# Patient Record
Sex: FEMALE | Race: BLACK OR AFRICAN AMERICAN | NOT HISPANIC OR LATINO | Employment: FULL TIME | URBAN - METROPOLITAN AREA
[De-identification: names, ages, dates, MRNs, and addresses within clinical notes are randomized per-mention and may not be internally consistent; named-entity substitution may affect disease eponyms.]

---

## 2017-02-08 RX ORDER — METHOCARBAMOL 750 MG/1
750 TABLET, FILM COATED ORAL 3 TIMES DAILY
Qty: 60 TABLET | Refills: 0 | Status: SHIPPED | OUTPATIENT
Start: 2017-02-08 | End: 2018-08-30

## 2017-02-24 ENCOUNTER — OFFICE VISIT (OUTPATIENT)
Dept: ORTHOPEDICS | Facility: CLINIC | Age: 47
End: 2017-02-24
Payer: COMMERCIAL

## 2017-02-24 ENCOUNTER — HOSPITAL ENCOUNTER (OUTPATIENT)
Dept: RADIOLOGY | Facility: HOSPITAL | Age: 47
Discharge: HOME | End: 2017-02-24
Attending: ORTHOPAEDIC SURGERY
Payer: COMMERCIAL

## 2017-02-24 VITALS
SYSTOLIC BLOOD PRESSURE: 154 MMHG | WEIGHT: 291.44 LBS | BODY MASS INDEX: 46.84 KG/M2 | DIASTOLIC BLOOD PRESSURE: 92 MMHG | HEART RATE: 99 BPM | HEIGHT: 66 IN

## 2017-02-24 DIAGNOSIS — M92.62 HAGLUND'S DEFORMITY, LEFT: ICD-10-CM

## 2017-02-24 DIAGNOSIS — R52 PAIN: ICD-10-CM

## 2017-02-24 DIAGNOSIS — R52 PAIN: Primary | ICD-10-CM

## 2017-02-24 DIAGNOSIS — M76.62 ACHILLES TENDINITIS, LEFT LEG: ICD-10-CM

## 2017-02-24 PROCEDURE — 73630 X-RAY EXAM OF FOOT: CPT | Mod: TC,LT

## 2017-02-24 PROCEDURE — 3077F SYST BP >= 140 MM HG: CPT | Mod: S$GLB,,, | Performed by: ORTHOPAEDIC SURGERY

## 2017-02-24 PROCEDURE — 99213 OFFICE O/P EST LOW 20 MIN: CPT | Mod: S$GLB,,, | Performed by: ORTHOPAEDIC SURGERY

## 2017-02-24 PROCEDURE — 73630 X-RAY EXAM OF FOOT: CPT | Mod: 26,LT,, | Performed by: RADIOLOGY

## 2017-02-24 PROCEDURE — 1160F RVW MEDS BY RX/DR IN RCRD: CPT | Mod: S$GLB,,, | Performed by: ORTHOPAEDIC SURGERY

## 2017-02-24 PROCEDURE — 3080F DIAST BP >= 90 MM HG: CPT | Mod: S$GLB,,, | Performed by: ORTHOPAEDIC SURGERY

## 2017-02-24 PROCEDURE — 99999 PR PBB SHADOW E&M-EST. PATIENT-LVL III: CPT | Mod: PBBFAC,,, | Performed by: ORTHOPAEDIC SURGERY

## 2017-02-24 RX ORDER — METHYLPREDNISOLONE 4 MG/1
TABLET ORAL
Qty: 1 PACKAGE | Refills: 0 | Status: SHIPPED | OUTPATIENT
Start: 2017-02-24 | End: 2017-03-24

## 2017-02-24 RX ORDER — MELOXICAM 15 MG/1
15 TABLET ORAL DAILY PRN
Qty: 30 TABLET | Refills: 3 | Status: ON HOLD | OUTPATIENT
Start: 2017-02-24 | End: 2017-11-15 | Stop reason: HOSPADM

## 2017-02-24 NOTE — MR AVS SNAPSHOT
[facility]  [address]  [facility] [address]  Phone: [phone]                  Steph Sterling   2017 2:45 PM   Office Visit    Description:  Female : 1970   Provider:  Bunny Saravia MD   Department:  [facility]           Reason for Visit     Follow-up           Diagnoses this Visit        Comments    Pain    -  Primary     Achilles tendinitis, left leg         Mohsen's deformity, left         Follow-up examination after orthopedic surgery         Achilles tendinitis of right lower extremity                To Do List           Goals (5 Years of Data)     None       These Medications        Disp Refills Start End    meloxicam (MOBIC) 15 MG tablet 30 tablet 3 2017     Take 1 tablet (15 mg total) by mouth daily as needed for Pain. - Oral    Pharmacy: [facility]  [facility] AT [facility] Ph #: [phone]       methylPREDNISolone (MEDROL, YAMILET,) 4 mg tablet 1 Package 0 2017     Take as instructed on package    Pharmacy: [facility]  [place] AT [facility] Ph #: [phone]         [facility]     [facility] -  Assistance  Registered nurses in the [facility] provide clinical advisement, health education, appointment booking, and other advisory services.  Call for this free service at [phone].             Medications           Message regarding Medications     Verify the changes and/or additions to your medication regime listed below are the same as discussed with your clinician today.  If any of these changes or additions are incorrect, please notify your healthcare provider.        START taking these NEW medications        Refills    methylPREDNISolone (MEDROL, YAMLIET,) 4 mg tablet 0    Sig: Take as instructed on package    Class: Normal           Verify that the below list of  medications is an accurate representation of the medications you are currently taking.  If none reported, the list may be blank. If incorrect, please contact your healthcare provider. Carry this list with you in case of emergency.           Current Medications     meloxicam (MOBIC) 15 MG tablet Take 1 tablet (15 mg total) by mouth daily as needed for Pain.    verapamil (VERELAN) 240 MG C24P Take 1 capsule (240 mg total) by mouth once daily.    diclofenac sodium (VOLTAREN) 1 % Gel Apply 2 g topically 4 (four) times daily.    methocarbamol (ROBAXIN) 750 MG Tab Take 1 tablet (750 mg total) by mouth 3 (three) times daily.    methylPREDNISolone (MEDROL, YAMILET,) 4 mg tablet Take as instructed on package           Clinical Reference Information           Your Vitals Were     BP                   154/92 (BP Location: Right arm, Patient Position: Sitting, BP Method: Automatic)           Blood Pressure          Most Recent Value    BP  (!)  154/92      Allergies as of 2/24/2017     No Known Allergies      Immunizations Administered on Date of Encounter - 2/24/2017     None      Orders Placed During Today's Visit     Future Labs/Procedures Expected by Expires    X-Ray Foot Complete Left  2/24/2017 2/24/2018      Language Assistance Services     ATTENTION: Language assistance services are available, free of charge. Please call [phone].      ATENCIÓN: Si jasonla joshua, tiene a roblero disposición servicios gratuitos de asistencia lingüística. Llame al [phone].     LENORE Ý: N?u b?n nói Ti?ng Vi?t, có các d?ch v? h? tr? ngôn ng? mi?n phí dành cho b?n. G?i s? [phone].         [facility] complies with applicable Federal civil rights laws and does not discriminate on the basis of race, color, national origin, age, disability, or sex.

## 2017-02-27 NOTE — PROGRESS NOTES
Ms. Sterling returns today.  This is a 46-year-old female who had previous surgery   on her right Achilles tendon insertion almost over a year ago now.  She reports   she is doing well on the right side, but over the last three months, she has   developed some similar symptoms at the insertion of her left Achilles tendon.    She has not had any treatment to this point.  She reports not improving, so she   came in for evaluation.    PHYSICAL EXAMINATION:  Reveals an enlargement and tenderness at the insertion of   the left Achilles tendon.  I ordered an x-ray of her left foot and she has a   Mohsen deformity of the posterior superior aspect of the calcaneus as well as   some slight insertional osteophyte formation and thickened Achilles tendon   shadow.    IMPRESSION:  Left insertional Achilles tendinitis with a Mohsen deformity.    RECOMMENDATIONS:  Treatment options were discussed with Ms. Sterling.  She would   like to try conservative measures before entertaining any surgical intervention.    She is going to go into her fracture boot for the next four weeks to see if   her symptoms will cool down.  I am going to put her on a Medrol Greg.  I am going   to have her return to see me in 4-6 weeks.  If her symptoms are improved, we   will proceed with physical therapy at that time.      EFRAÍN/IN  dd: 02/27/2017 07:12:56 (CST)  td: 02/28/2017 00:21:56 (CST)  Doc ID   #[id number]  Job ID #[id number]    CC:

## 2017-03-01 ENCOUNTER — PATIENT MESSAGE (OUTPATIENT)
Dept: ORTHOPEDICS | Facility: CLINIC | Age: 47
End: 2017-03-01

## 2017-03-24 ENCOUNTER — OFFICE VISIT (OUTPATIENT)
Dept: ORTHOPEDICS | Facility: CLINIC | Age: 47
End: 2017-03-24
Payer: COMMERCIAL

## 2017-03-24 VITALS — WEIGHT: 291.44 LBS | HEIGHT: 66 IN | BODY MASS INDEX: 46.84 KG/M2

## 2017-03-24 DIAGNOSIS — M76.62 ACHILLES TENDINITIS, LEFT LEG: Primary | ICD-10-CM

## 2017-03-24 PROCEDURE — 99213 OFFICE O/P EST LOW 20 MIN: CPT | Mod: S$GLB,,, | Performed by: ORTHOPAEDIC SURGERY

## 2017-03-24 PROCEDURE — 1160F RVW MEDS BY RX/DR IN RCRD: CPT | Mod: S$GLB,,, | Performed by: ORTHOPAEDIC SURGERY

## 2017-03-24 PROCEDURE — 99999 PR PBB SHADOW E&M-EST. PATIENT-LVL III: CPT | Mod: PBBFAC,,, | Performed by: ORTHOPAEDIC SURGERY

## 2017-03-25 NOTE — PROGRESS NOTES
Ms. Sterling returns today.  She had developed some left insertional Achilles   tendinitis over the last few months.  I saw her four weeks ago and recommended a   period of boot immobilization.  She returns today for followup.  She reports   her symptoms are improved.  On exam, there is minimal tenderness and swelling.    At this point, I am going to send her to Physical Therapy and have her return to   see me in six weeks.  She can come out of the boot as well.      EFRAÍN/ASHLEY  dd: 03/24/2017 15:14:37 (CDT)  td: 03/25/2017 05:14:58 (CDT)  Doc ID   #[id number]  Job ID #[id number]    CC:

## 2017-04-11 ENCOUNTER — CLINICAL SUPPORT (OUTPATIENT)
Dept: REHABILITATION | Facility: HOSPITAL | Age: 47
End: 2017-04-11
Attending: ORTHOPAEDIC SURGERY
Payer: COMMERCIAL

## 2017-04-11 DIAGNOSIS — R26.2 DIFFICULTY WALKING: Primary | ICD-10-CM

## 2017-04-11 DIAGNOSIS — M25.60 JOINT STIFFNESS: ICD-10-CM

## 2017-04-11 DIAGNOSIS — M76.62 ACHILLES TENDINITIS OF LEFT LOWER EXTREMITY: ICD-10-CM

## 2017-04-11 DIAGNOSIS — M62.81 MUSCLE WEAKNESS: ICD-10-CM

## 2017-04-11 PROCEDURE — 97161 PT EVAL LOW COMPLEX 20 MIN: CPT | Mod: PN

## 2017-04-11 PROCEDURE — 97140 MANUAL THERAPY 1/> REGIONS: CPT | Mod: PN

## 2017-04-11 PROCEDURE — 97110 THERAPEUTIC EXERCISES: CPT | Mod: PN

## 2017-04-11 NOTE — PROGRESS NOTES
k  TIME RECORD    Date: 04/11/2017    Start Time:  8:00  Stop Time:  9:00      Total Timed Minutes:  60 minutes      OUTPATIENT PHYSICAL THERAPY   PATIENT EVALUATION  Onset Date: October 2016  Primary Diagnosis:   1. Difficulty walking     2. Achilles tendinitis of left lower extremity     3. Joint stiffness     4. Muscle weakness       Treatment Diagnosis: see above  Past Medical History:   Diagnosis Date    HTN (hypertension) 12/26/2012    Migraine with acute onset aura 12/26/2012     Precautions: none  Prior Therapy:    Medications: Steph Sterling has a current medication list which includes the following prescription(s): diclofenac sodium, meloxicam, methocarbamol, and verapamil.  Nutrition:  Obese  Prior Level of Function: Independent  Social History:   Place of Residence (Steps/Adaptations): [facility]     Steph Sterling is a 46 year old female presenting with c/o left ankle pain and a diagnosis of achilles tendinitis.   She reports an insidious onset last October that is worsening.  Recent x-rays are negative. She currently works as a teacher and encounters multiple stairs daily. Her goal is to avoid surgery and to stand and walk as needed without pain.     Pain:  Location: left achilles tendon    Activities Which Increase Pain: A.M,  Initial walking, standing (15 min), stairs  Activities Which Decrease Pain: rest, medication  Pain Scale: 2/10 at best 6/10 now  8/10 at worst    Objective     Observation:  Pt presents ambulating with an antalgic gait. Slight decrease in stance on the involved left LE.  Stands with mild forefoot pronation.   Palpation: moderate tenderness on palpation medial distal achilles. Slight tess-calcaneal swelling globally.     Range of Motion/Strength:      Ankle  Right  LEFT     AROM MMT AROM MMT   Dorsiflexion 2 4 -2 4-   Plantar Flexion 42 4 38 4-   Inversion 33 4 28 4-   Eversion 12 4 8 4-   Soleus 8 4 4 4-       AROM: Bilateral LE: Grossly WFL  MMT:   Right LE: 4/5   Left LE: 4/5      Bed Mobility:Independent  Transfers: Independent      Treatment:   Pt received therapeutic exercises to develop strength, endurance, ROM, flexibility, posture and core stabilization for 15 minutes including:    -towel stretch 20 sec x 4  -gastroc stretch 30 sec x 4  -soleus stretch 30 sec x 4      Pt received manual therapy to improve mobility for 10 minutes:  -tool assisted STM to distal achilles, gastroc/soleus complex    Ice massage distal achilles post treatment x 5 min      Pt was instructed in and given a home exercise program consisting of the above activities.       Assessment     Pt presents with signs and symptoms consistent with referring diagnosis. Evaluation has determined a decrease in functional status and subjective and objective deficits that can be addressed by physical therapy intervention. Pt demonstrates pain limiting functional activities. Decreased flexibility and strength limiting normal movement patterns. Decreased segmental motion. Decreased postural strength and awareness. Positive special testing. Decreased participation in functional and recreational activities. Subjective and objective measures are addressed by goals in the plan of care.  Patient/family are involved in the development of these goals. Patient/family are educated about current injury and treatment. Pt demonstrates no additional cultural, spiritual or educational need and currently has no barriers to learning.      Pt responded well to treatment today. Pt is a good candidate for skilled physical therapy intervention and has a good prognosis and is motivated to return to functional and  recreational activities.    Rehab Potential: good     History  Co-morbidities and personal factors that may impact the plan of care Examination  Body Structures and Functions, activity limitations and participation restrictions that may impact the plan of care Clinical Presentation   Decision Making/  Complexity Score   Co-morbidities:   obesity             Personal Factors:   School schedule Body Regions:  ankle        Body Systems: musculoskeletal          Activity limitations:  Prolonged standing, walking, stairs      Participation Restrictions:  School teaching, household chores         Stable  Low         Short Term Goals (4 Weeks):     1.Pt to increase strength by a 1/2 grade of muscles test to allow for improvement in functional activities such as performing chores.  2.Pt to improve range of motion by 25% to allow for improved functional mobility to allow for improvement in IADLs.   3.Pt to report compliance with HEP and demonstrate proper exercise technique to PT to show competence with self management of condition.  4.Decrease pain by 25% during functional activities.    Long Term Goals (12 Weeks):     1. Increase ROM to allow improved joint biomechanics during functional activities.   2.Increase trunk and lower extremity strength to within normal limits during functional activities.   3. Independent with home exercise program.   4. Full return to functional activities with manageable complaints.  5. Patient to demonstrate improved posture and body mechanics.  6. Decrease pain by 75% during functional activities.    CMS Impairment/Limitation/Restriction for FOTO Ankle Survey  Status Limitation G-Code CMS Severity Modifier  Intake 30% 70% Current Status CL - At least 60 percent but less than 80 percent  Predicted 56% 44% Goal Status+ CK - At least 40 percent but less than 60 percent  PT goal: 20-40% impaired    Plan     Certification Period: 4/11/17 to 7/11/17    Recommended Treatment Plan: 2-3 times per week for 12 weeks with treatments to consist of:  Neuromuscular and postural re-education,  training, therapeutic exercise, therapeutic activities,balance training, gait training, manual therapy, soft tissue mobilization, ROM exercises, Cardiovascular, Postural stabilization, manual  traction, spinal mobilization, moist heat, cryotherapy, electrical stimulation, ultrasound, home exercise education and planning.      Therapist: Yan Nelson, PT

## 2017-04-11 NOTE — PLAN OF CARE
OUTPATIENT PHYSICAL THERAPY   PATIENT EVALUATION  Onset Date: October 2016  Primary Diagnosis:   1. Difficulty walking     2. Achilles tendinitis of left lower extremity     3. Joint stiffness     4. Muscle weakness       Treatment Diagnosis: see above  Past Medical History:   Diagnosis Date    HTN (hypertension) 12/26/2012    Migraine with acute onset aura 12/26/2012     Precautions: none  Prior Therapy:    Medications: Steph Sterling has a current medication list which includes the following prescription(s): diclofenac sodium, meloxicam, methocarbamol, and verapamil.  Nutrition:  Obese  Prior Level of Function: Independent  Social History:   Place of Residence (Steps/Adaptations): [facility]    Subjective     Steph Sterling is a 46 year old female presenting with c/o left ankle pain and a diagnosis of achilles tendinitis.   She reports an insidious onset last October that is worsening.  Recent x-rays are negative. She currently works as a teacher and encounters multiple stairs daily. Her goal is to avoid surgery and to stand and walk as needed without pain.     Pain:  Location: left achilles tendon    Activities Which Increase Pain: A.M,  Initial walking, standing (15 min), stairs  Activities Which Decrease Pain: rest, medication  Pain Scale: 2/10 at best 6/10 now  8/10 at worst    Objective     Observation:  Pt presents ambulating with an antalgic gait. Slight decrease in stance on the involved left LE.  Stands with mild forefoot pronation.   Palpation: moderate tenderness on palpation medial distal achilles. Slight tess-calcaneal swelling globally.     Range of Motion/Strength:      Ankle  Right  LEFT     AROM MMT AROM MMT   Dorsiflexion 2 4 -2 4-   Plantar Flexion 42 4 38 4-   Inversion 33 4 28 4-   Eversion 12 4 8 4-   Soleus 8 4 4 4-       AROM: Bilateral LE: Grossly WFL  MMT:  Right LE: 4/5   Left LE: 4/5      Bed Mobility:Independent  Transfers: Independent      Treatment:   Pt received  therapeutic exercises to develop strength, endurance, ROM, flexibility, posture and core stabilization for 15 minutes including:    -towel stretch 20 sec x 4  -gastroc stretch 30 sec x 4  -soleus stretch 30 sec x 4      Pt received manual therapy to improve mobility for 10 minutes:  -tool assisted STM to distal achilles, gastroc/soleus complex    Ice massage distal achilles post treatment x 5 min      Pt was instructed in and given a home exercise program consisting of the above activities.       Assessment     Pt presents with signs and symptoms consistent with referring diagnosis. Evaluation has determined a decrease in functional status and subjective and objective deficits that can be addressed by physical therapy intervention. Pt demonstrates pain limiting functional activities. Decreased flexibility and strength limiting normal movement patterns. Decreased segmental motion. Decreased postural strength and awareness. Positive special testing. Decreased participation in functional and recreational activities. Subjective and objective measures are addressed by goals in the plan of care.  Patient/family are involved in the development of these goals. Patient/family are educated about current injury and treatment. Pt demonstrates no additional cultural, spiritual or educational need and currently has no barriers to learning.      Pt responded well to treatment today. Pt is a good candidate for skilled physical therapy intervention and has a good prognosis and is motivated to return to functional and  recreational activities.    Rehab Potential: good     History  Co-morbidities and personal factors that may impact the plan of care Examination  Body Structures and Functions, activity limitations and participation restrictions that may impact the plan of care Clinical Presentation   Decision Making/ Complexity Score   Co-morbidities:   obesity             Personal Factors:   School schedule Body Regions:   ankle        Body Systems: musculoskeletal          Activity limitations:  Prolonged standing, walking, stairs      Participation Restrictions:  School teaching, household chores         Stable  Low         Short Term Goals (4 Weeks):     1.Pt to increase strength by a 1/2 grade of muscles test to allow for improvement in functional activities such as performing chores.  2.Pt to improve range of motion by 25% to allow for improved functional mobility to allow for improvement in IADLs.   3.Pt to report compliance with HEP and demonstrate proper exercise technique to PT to show competence with self management of condition.  4.Decrease pain by 25% during functional activities.    Long Term Goals (12 Weeks):     1. Increase ROM to allow improved joint biomechanics during functional activities.   2.Increase trunk and lower extremity strength to within normal limits during functional activities.   3. Independent with home exercise program.   4. Full return to functional activities with manageable complaints.  5. Patient to demonstrate improved posture and body mechanics.  6. Decrease pain by 75% during functional activities.    CMS Impairment/Limitation/Restriction for FOTO Ankle Survey  Status Limitation G-Code CMS Severity Modifier  Intake 30% 70% Current Status CL - At least 60 percent but less than 80 percent  Predicted 56% 44% Goal Status+ CK - At least 40 percent but less than 60 percent  PT goal: 20-40% impaired    Plan     Certification Period: 4/11/17 to 7/11/17    Recommended Treatment Plan: 2-3 times per week for 12 weeks with treatments to consist of:  Neuromuscular and postural re-education,  training, therapeutic exercise, therapeutic activities,balance training, gait training, manual therapy, soft tissue mobilization, ROM exercises, Cardiovascular, Postural stabilization, manual traction, spinal mobilization, moist heat, cryotherapy, electrical stimulation, ultrasound, home exercise education  and planning.      Therapist: Yan Nelson, PT

## 2017-04-17 ENCOUNTER — CLINICAL SUPPORT (OUTPATIENT)
Dept: REHABILITATION | Facility: HOSPITAL | Age: 47
End: 2017-04-17
Attending: ORTHOPAEDIC SURGERY
Payer: COMMERCIAL

## 2017-04-17 DIAGNOSIS — R26.2 DIFFICULTY WALKING: ICD-10-CM

## 2017-04-17 DIAGNOSIS — M76.62 ACHILLES TENDINITIS OF LEFT LOWER EXTREMITY: Primary | ICD-10-CM

## 2017-04-17 DIAGNOSIS — M25.60 JOINT STIFFNESS: ICD-10-CM

## 2017-04-17 DIAGNOSIS — M62.81 MUSCLE WEAKNESS: ICD-10-CM

## 2017-04-17 PROCEDURE — 97110 THERAPEUTIC EXERCISES: CPT | Mod: PN

## 2017-04-17 PROCEDURE — 97140 MANUAL THERAPY 1/> REGIONS: CPT | Mod: 59,PN

## 2017-04-17 NOTE — PROGRESS NOTES
Physical Therapy Daily Note     Name: Steph Sterling  Clinic Number: [id number]  Diagnosis:   Encounter Diagnoses   Name Primary?    Achilles tendinitis of left lower extremity Yes    Difficulty walking     Joint stiffness     Muscle weakness      Physician: Bunny Saravia MD  Visit #: 2 of 25   PTA Visit #: 1     Time In: 0932  Time Out: 1030  Total Treatment Time: 58 minutes (1:1 with PTA 30 minutes of treatment session)     Subjective     Pt reports: Steph states her ankle is feeling okay this morning. Patient reports compliance with HEP the last week.   Pain Scale: Steph rates pain on a scale of 0-10 to be 6 currently.    Objective     Steph received individual therapeutic exercises to develop strength, endurance, ROM, flexibility, posture and core stabilization for 40 minutes including:  -towel stretch 20 sec x 4  -CW/CCW circles 2x10 ea direction  -ABC's x 1 trial  -4 way ankle strengthening 2x10 (YTB)  -Towel crunches 2x10, 5 s ec hold   -Seated heel raises on wedge 2x10  -Standing gastroc stretch 30 sec x 4  -Standing soleus stretch 30 sec x 4    Steph received the following manual therapy techniques: Soft tissue Mobilization and Friction Massage were applied to the: left achilles/gastroc complex for 15 minutes including:    Steph received ice massage to left distal achilles x 5 minutes.    Written Home Exercises Provided: Reviewed HEP from IE.  Pt demo good understanding of the education provided. Steph demonstrated good return demonstration of activities.     Education provided re:Steph verbalized good understanding of education provided.   No spiritual or educational barriers to learning provided    Assessment     Steph tolerated treatment well today. Patient with fair tolerance to manual therapy techniques with increased hypersensitivity noted at distal achilles. Patient with moderate restriction throughout gastroc/solues complex  contributing to decreased dorsiflexion ROM. Patient ambulates with antalgic gait pattern with decreased weightbearing through LLE observed.   This is a 46 y.o. female referred to outpatient physical therapy and presents with a medical diagnosis of left achilles tendonitis and demonstrates limitations as described in the problem list. Pt prognosis is Good. Pt will continue to benefit from skilled outpatient physical therapy to address the deficits listed in the problem list, provide pt/family education and to maximize pt's level of independence in the home and community environment.     Goals as follows:    Short Term Goals (4 Weeks):     1.Pt to increase strength by a 1/2 grade of muscles test to allow for improvement in functional activities such as performing chores.  2.Pt to improve range of motion by 25% to allow for improved functional mobility to allow for improvement in IADLs.   3.Pt to report compliance with HEP and demonstrate proper exercise technique to PT to show competence with self management of condition.  4.Decrease pain by 25% during functional activities.    Long Term Goals (12 Weeks):     1. Increase ROM to allow improved joint biomechanics during functional activities.   2.Increase trunk and lower extremity strength to within normal limits during functional activities.   3. Independent with home exercise program.   4. Full return to functional activities with manageable complaints.  5. Patient to demonstrate improved posture and body mechanics.  6. Decrease pain by 75% during functional activities     Plan     Continue with established Plan of Care towards PT goals.    Therapist: Amina Epperson, PTA  4/17/2017

## 2017-04-25 ENCOUNTER — CLINICAL SUPPORT (OUTPATIENT)
Dept: REHABILITATION | Facility: HOSPITAL | Age: 47
End: 2017-04-25
Attending: ORTHOPAEDIC SURGERY
Payer: COMMERCIAL

## 2017-04-25 DIAGNOSIS — R26.2 DIFFICULTY WALKING: ICD-10-CM

## 2017-04-25 DIAGNOSIS — M76.62 ACHILLES TENDINITIS OF LEFT LOWER EXTREMITY: Primary | ICD-10-CM

## 2017-04-25 DIAGNOSIS — M25.60 JOINT STIFFNESS: ICD-10-CM

## 2017-04-25 DIAGNOSIS — M62.81 MUSCLE WEAKNESS: ICD-10-CM

## 2017-04-25 PROCEDURE — 97110 THERAPEUTIC EXERCISES: CPT | Mod: PN

## 2017-04-25 PROCEDURE — 97140 MANUAL THERAPY 1/> REGIONS: CPT | Mod: PN

## 2017-04-25 NOTE — PROGRESS NOTES
Physical Therapy Daily Note     Name: Steph Sterling  Clinic Number: [id number]  Diagnosis:   Encounter Diagnoses   Name Primary?    Achilles tendinitis of left lower extremity Yes    Difficulty walking     Joint stiffness     Muscle weakness      Physician: Bunny Saravia MD  Visit #: 3 of 25     Time In: 4:20  Time Out: 5:15  Total Treatment Time: 55 minutes (1:1 with PT for 45 minutes of treatment session)     Subjective     Pt reports: Pt states her heel continues to be moderately sore.   Pain Scale: Steph rates pain on a scale of 0-10 to be 6 currently.    Objective     Steph received individual therapeutic exercises to develop strength, endurance, ROM, flexibility, posture and core stabilization for 40 minutes including:  -towel stretch 20 sec x 4  -CW/CCW circles 2x10 ea direction  -ABC's x 1 trial  -4 way ankle strengthening 2x10 (YTB)  -Towel crunches 2x10, 5 s ec hold   -Seated heel raises on wedge 2x10  -Standing gastroc stretch 30 sec x 4  -Standing soleus stretch 30 sec x 4  -standing eccentric heel raises 2 up 1 down 3 x 10    Steph received the following manual therapy techniques: Soft tissue Mobilization and Friction Massage were applied to the: left achilles/gastroc complex for 15 minutes including:    Steph received ice massage to left distal achilles x 5 minutes.    Written Home Exercises Provided: Reviewed HEP from IE.  Pt demo good understanding of the education provided. Steph demonstrated good return demonstration of activities.     Education provided re:Steph verbalized good understanding of education provided.   No spiritual or educational barriers to learning provided    Assessment     Continued moderate tenderness on palpation of distal achilles globally with STM. Pt demonstrates a good understanding of the education provided and a good return demonstration of activities. Pt  Requires skilled supervision to complete and  progress home program.    Medical necessity is demonstrated by the following IMPAIRMENTS:  -pain   -decreased range of motion/flexibility   -decreased muscle strength   -impaired function -    -decreased ADL ability  -decreased recreational ability     Patient is making good progress towards established goals.      Short Term Goals (4 Weeks):     1.Pt to increase strength by a 1/2 grade of muscles test to allow for improvement in functional activities such as performing chores.  2.Pt to improve range of motion by 25% to allow for improved functional mobility to allow for improvement in IADLs.   3.Pt to report compliance with HEP and demonstrate proper exercise technique to PT to show competence with self management of condition.  4.Decrease pain by 25% during functional activities.    Long Term Goals (12 Weeks):     1. Increase ROM to allow improved joint biomechanics during functional activities.   2.Increase trunk and lower extremity strength to within normal limits during functional activities.   3. Independent with home exercise program.   4. Full return to functional activities with manageable complaints.  5. Patient to demonstrate improved posture and body mechanics.  6. Decrease pain by 75% during functional activities     Plan     Continue with established Plan of Care towards PT goals.    Therapist: Yan Nelson, PT  4/25/2017

## 2017-05-02 ENCOUNTER — CLINICAL SUPPORT (OUTPATIENT)
Dept: REHABILITATION | Facility: HOSPITAL | Age: 47
End: 2017-05-02
Attending: ORTHOPAEDIC SURGERY
Payer: COMMERCIAL

## 2017-05-02 DIAGNOSIS — M62.81 MUSCLE WEAKNESS: ICD-10-CM

## 2017-05-02 DIAGNOSIS — R26.2 DIFFICULTY WALKING: ICD-10-CM

## 2017-05-02 DIAGNOSIS — M76.62 ACHILLES TENDINITIS OF LEFT LOWER EXTREMITY: Primary | ICD-10-CM

## 2017-05-02 DIAGNOSIS — M25.60 JOINT STIFFNESS: ICD-10-CM

## 2017-05-02 PROCEDURE — 97110 THERAPEUTIC EXERCISES: CPT | Mod: PN

## 2017-05-02 PROCEDURE — 97140 MANUAL THERAPY 1/> REGIONS: CPT | Mod: PN

## 2017-05-02 NOTE — PROGRESS NOTES
Physical Therapy Daily Note     Name: Steph Sterling  Clinic Number: [id number]  Diagnosis:   Encounter Diagnoses   Name Primary?    Achilles tendinitis of left lower extremity Yes    Difficulty walking     Joint stiffness     Muscle weakness      Physician: Bunny Saravia MD  Visit #: 4 of 25     Time In: 1632  Time Out: 3505  Total Treatment Time: 53 minutes (1:1 with PTA for 38 minutes of treatment session)     Subjective     Pt reports: Patient she is really starting to feel a difference in her foot. 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   Pain Scale: Steph rates pain on a scale of 0-10 to be 6 currently.    Objective     Steph received individual therapeutic exercises to develop strength, endurance, ROM, flexibility, posture and core stabilization for 40 minutes including:  Nu step x 6 minutes   -towel stretch 20 sec x 4  -CW/CCW circles 2x10 ea direction  -ABC's x 1 trial  -4 way ankle strengthening 2x10 (RTB)  -Towel crunches 2x10, 5 s ec hold   -Seated heel raises on wedge 2x10  -Standing gastroc stretch 30 sec x 4  -Standing soleus stretch 30 sec x 4  -standing eccentric heel raises 2 up 1 down 3 x 10    Steph received the following manual therapy techniques: Soft tissue Mobilization and Friction Massage were applied to the: left achilles/gastroc complex for 15 minutes including:    Steph received ice massage to left distal achilles x 5 minutes.    Written Home Exercises Provided: Reviewed HEP from IE.  Pt demo good understanding of the education provided. Steph demonstrated good return demonstration of activities.     Education provided re:Steph verbalized good understanding of education provided.   No spiritual or educational barriers to learning provided    Assessment     Steph tolerated treatment well today. Patient demonstrates improving tolerance to manual therapy  techniques but mod/max tissue restrictions continue to be palpated through entire gastroc/soleous complex. Patient able to progress resistance of ankle strengthening today with good ability to isolate ankle musculature noted. Pt demonstrates a good understanding of the education provided and a good return demonstration of activities. Pt  Requires skilled supervision to complete and progress home program.    Medical necessity is demonstrated by the following IMPAIRMENTS:  -pain   -decreased range of motion/flexibility   -decreased muscle strength   -impaired function -    -decreased ADL ability  -decreased recreational ability     Patient is making good progress towards established goals.    Short Term Goals (4 Weeks):     1.Pt to increase strength by a 1/2 grade of muscles test to allow for improvement in functional activities such as performing chores.  2.Pt to improve range of motion by 25% to allow for improved functional mobility to allow for improvement in IADLs.   3.Pt to report compliance with HEP and demonstrate proper exercise technique to PT to show competence with self management of condition.  4.Decrease pain by 25% during functional activities.    Long Term Goals (12 Weeks):     1. Increase ROM to allow improved joint biomechanics during functional activities.   2.Increase trunk and lower extremity strength to within normal limits during functional activities.   3. Independent with home exercise program.   4. Full return to functional activities with manageable complaints.  5. Patient to demonstrate improved posture and body mechanics.  6. Decrease pain by 75% during functional activities     Plan     Continue with established Plan of Care towards PT goals.    Therapist: Amina Epperson, PTA  5/2/2017

## 2017-05-04 ENCOUNTER — CLINICAL SUPPORT (OUTPATIENT)
Dept: REHABILITATION | Facility: HOSPITAL | Age: 47
End: 2017-05-04
Attending: ORTHOPAEDIC SURGERY
Payer: COMMERCIAL

## 2017-05-04 DIAGNOSIS — M25.571 CHRONIC PAIN OF RIGHT ANKLE: ICD-10-CM

## 2017-05-04 DIAGNOSIS — M25.60 JOINT STIFFNESS: ICD-10-CM

## 2017-05-04 DIAGNOSIS — M76.62 ACHILLES TENDINITIS OF LEFT LOWER EXTREMITY: Primary | ICD-10-CM

## 2017-05-04 DIAGNOSIS — G89.29 CHRONIC PAIN OF RIGHT ANKLE: ICD-10-CM

## 2017-05-04 DIAGNOSIS — M62.81 MUSCLE WEAKNESS: ICD-10-CM

## 2017-05-04 DIAGNOSIS — R26.2 DIFFICULTY WALKING: ICD-10-CM

## 2017-05-04 PROCEDURE — 97110 THERAPEUTIC EXERCISES: CPT | Mod: PN

## 2017-05-04 PROCEDURE — 97140 MANUAL THERAPY 1/> REGIONS: CPT | Mod: PN

## 2017-05-04 NOTE — PROGRESS NOTES
Physical Therapy Daily Note     Name: Steph Sterling  Clinic Number: [id number]  Diagnosis:   Encounter Diagnoses   Name Primary?    Achilles tendinitis of left lower extremity Yes    Difficulty walking     Joint stiffness     Muscle weakness     Chronic pain of right ankle      Physician: Bunny Saravia MD  Visit #: 5 of 25     Time In: 4:30  Time Out: 5:30  Total Treatment Time: 60 minutes (1:1 with PTA for 30 minutes of treatment session)     Subjective     Pt reports: Pt reports increased pain today due to prolonged standing for work. States overall the heel is improving.   Pain Scale: Steph rates pain on a scale of 0-10 to be 5 currently.    Objective     Steph received individual therapeutic exercises to develop strength, endurance, ROM, flexibility, posture and core stabilization for 40 minutes including:  Nu step x 6 minutes   -towel stretch 20 sec x 4  -CW/CCW circles 2x10 ea direction  -ABC's x 1 trial  -4 way ankle strengthening 2x10 (RTB)  -Towel crunches 2x10, 5 s ec hold   -Seated heel raises on wedge 2x10  -Standing gastroc stretch 30 sec x 4  -Standing soleus stretch 30 sec x 4  -standing eccentric heel raises 2 up 1 down 3 x 10  -hip abd with red t-band 2 x 10  -single leg balance on dynapad 20 sec x 4    Steph received the following manual therapy techniques: Soft tissue Mobilization and Friction Massage were applied to the: left achilles/gastroc complex for 15 minutes including:    Steph received ice massage to left distal achilles x 5 minutes.    Written Home Exercises Provided: Reviewed HEP from IE.  Pt demo good understanding of the education provided. Steph demonstrated good return demonstration of activities.     Education provided re:Steph verbalized good understanding of education provided.   No spiritual or educational barriers to learning provided    Assessment     Continued moderate tenderness at lateral posterior  calcaneus. Good response to exercise progression with mild c/o discomfort.  Pt demonstrates a good understanding of the education provided and a good return demonstration of activities. Pt  Requires skilled supervision to complete and progress home program.    Medical necessity is demonstrated by the following IMPAIRMENTS:  -pain   -decreased range of motion/flexibility   -decreased muscle strength   -impaired function -    -decreased ADL ability  -decreased recreational ability     Patient is making good progress towards established goals.    Short Term Goals (4 Weeks):     1.Pt to increase strength by a 1/2 grade of muscles test to allow for improvement in functional activities such as performing chores.  2.Pt to improve range of motion by 25% to allow for improved functional mobility to allow for improvement in IADLs.   3.Pt to report compliance with HEP and demonstrate proper exercise technique to PT to show competence with self management of condition.  4.Decrease pain by 25% during functional activities.    Long Term Goals (12 Weeks):     1. Increase ROM to allow improved joint biomechanics during functional activities.   2.Increase trunk and lower extremity strength to within normal limits during functional activities.   3. Independent with home exercise program.   4. Full return to functional activities with manageable complaints.  5. Patient to demonstrate improved posture and body mechanics.  6. Decrease pain by 75% during functional activities     Plan     Continue with established Plan of Care towards PT goals.    Therapist: Yan Nelson, PT  5/4/2017

## 2017-05-08 ENCOUNTER — OFFICE VISIT (OUTPATIENT)
Dept: ORTHOPEDICS | Facility: CLINIC | Age: 47
End: 2017-05-08
Payer: COMMERCIAL

## 2017-05-08 VITALS — HEIGHT: 66 IN | BODY MASS INDEX: 46.61 KG/M2 | WEIGHT: 290 LBS

## 2017-05-08 DIAGNOSIS — M92.62 HAGLUND'S DEFORMITY, LEFT: ICD-10-CM

## 2017-05-08 DIAGNOSIS — M76.62 ACHILLES TENDINITIS, LEFT LEG: Primary | ICD-10-CM

## 2017-05-08 PROCEDURE — 99999 PR PBB SHADOW E&M-EST. PATIENT-LVL II: CPT | Mod: PBBFAC,,, | Performed by: ORTHOPAEDIC SURGERY

## 2017-05-08 PROCEDURE — 1160F RVW MEDS BY RX/DR IN RCRD: CPT | Mod: S$GLB,,, | Performed by: ORTHOPAEDIC SURGERY

## 2017-05-08 PROCEDURE — 99213 OFFICE O/P EST LOW 20 MIN: CPT | Mod: S$GLB,,, | Performed by: ORTHOPAEDIC SURGERY

## 2017-05-10 NOTE — PROGRESS NOTES
Mr. Sterling returns today for followup of her left insertional Achilles tendinitis.    She was last here six weeks ago, at which time she had just come out of her   boot.  I sent her to physical therapy and she reports overall she is making   progress.  She reports some continued mild tenderness, but states she is   improved.  Examination reveals minimal swelling with some continued tenderness   at the insertion of Achilles tendon.  She would like to give this more time and   continue with physical therapy.  As long as she is improving, she is likely not   to proceed with any surgical intervention.  I am going to have her return to see   me on an as needed basis.      EFRAÍN/ASHLEY  dd: 05/09/2017 07:01:08 (CHRISTINE)  td: 05/10/2017 00:03:28 (CHRISTINE)  Doc ID   #[id number]  Job ID #[id number]    CC:

## 2017-05-18 ENCOUNTER — CLINICAL SUPPORT (OUTPATIENT)
Dept: REHABILITATION | Facility: HOSPITAL | Age: 47
End: 2017-05-18
Attending: ORTHOPAEDIC SURGERY
Payer: COMMERCIAL

## 2017-05-18 DIAGNOSIS — M25.60 JOINT STIFFNESS: ICD-10-CM

## 2017-05-18 DIAGNOSIS — R26.2 DIFFICULTY WALKING: ICD-10-CM

## 2017-05-18 DIAGNOSIS — M62.81 MUSCLE WEAKNESS: ICD-10-CM

## 2017-05-18 DIAGNOSIS — M76.62 ACHILLES TENDINITIS OF LEFT LOWER EXTREMITY: Primary | ICD-10-CM

## 2017-05-18 PROCEDURE — 97140 MANUAL THERAPY 1/> REGIONS: CPT | Mod: PN

## 2017-05-18 PROCEDURE — 97110 THERAPEUTIC EXERCISES: CPT | Mod: PN

## 2017-05-18 NOTE — PROGRESS NOTES
Physical Therapy Daily Note     Name: Steph Sterling  Clinic Number: [id number]  Diagnosis:   Encounter Diagnoses   Name Primary?    Achilles tendinitis of left lower extremity Yes    Difficulty walking     Joint stiffness     Muscle weakness      Priority Start Date Expiration Date Referral Entered By   Routine 03/24/2017 12/31/2017 Bunny Saravia MD      Visits Requested Visits Authorized Visits Completed Visits Scheduled   1 25 6           Time In: 4:30  Time Out: 5:30  Total Treatment Time: 60 minutes (1:1 with PT for 30 minutes of treatment session)     Subjective     Pt returns from MD with instructions to continue therapy.  Pt states the heel continues to be sore. States she was standing for long periods of time today.   Pain Scale: Steph rates pain on a scale of 0-10 to be 5 currently.    Pain:  Location: left achilles tendon     Activities Which Increase Pain: A.M, Initial walking, standing (20 min), stairs  Activities Which Decrease Pain: rest, medication  Pain Scale: 2/10 at best 6/10 now 7/10 at worst     Objective      Observation:  Pt presents ambulating with an antalgic gait. Slight decrease in stance on the involved left LE. Stands with mild forefoot pronation.   Palpation: moderate tenderness on palpation medial distal achilles. Slight tess-calcaneal swelling globally.      Range of Motion/Strength:      Ankle  Right   LEFT       AROM MMT AROM MMT   Dorsiflexion 2 4 0 4   Plantar Flexion 42 4 40 4   Inversion 33 4 30 4-   Eversion 12 4 10 4-   Soleus 8 4 6 4-         AROM: Bilateral LE: Grossly WFL  MMT: Right LE: 4/5 Left LE: 4/5      Objective     Steph received individual therapeutic exercises to develop strength, endurance, ROM, flexibility, posture and core stabilization for 40 minutes including:  Nu step x 6 minutes   -towel stretch 20 sec x 4  -CW/CCW circles 2x10 ea direction  -ABC's x 1 trial  -4 way ankle strengthening 2x10  (RTB)  -Towel crunches 2x10, 5 s ec hold   -Seated heel raises on wedge 2x10  -Standing gastroc stretch 30 sec x 4  -Standing soleus stretch 30 sec x 4  -standing eccentric heel raises 2 up 1 down 3 x 10  -hip abd with red t-band 2 x 10  -single leg balance on dynapad 20 sec x 4  -shuttle squats 1.5 bands 3 x 10    Steph received the following manual therapy techniques: Soft tissue Mobilization and Friction Massage were applied to the: left achilles/gastroc complex for 15 minutes including:    Steph received ice massage to left distal achilles x 5 minutes.    Written Home Exercises Provided: Reviewed HEP from IE.  Pt demo good understanding of the education provided. Steph demonstrated good return demonstration of activities.     Education provided re:Steph verbalized good understanding of education provided.   No spiritual or educational barriers to learning provided    Assessment     Improved response to CKC therex. Continued moderate tenderness at lateral posterior calcaneus. .  Pt demonstrates a good understanding of the education provided and a good return demonstration of activities. Pt  Requires skilled supervision to complete and progress home program.    Medical necessity is demonstrated by the following IMPAIRMENTS:  -pain   -decreased range of motion/flexibility   -decreased muscle strength   -impaired function -    -decreased ADL ability  -decreased recreational ability     Patient is making good progress towards established goals.    Short Term Goals (4 Weeks):  Updated 5/18/2017  Partially MET    1.Pt to increase strength by a 1/2 grade of muscles test to allow for improvement in functional activities such as performing chores. MET  2.Pt to improve range of motion by 25% to allow for improved functional mobility to allow for improvement in IADLs.  MET  3.Pt to report compliance with HEP and demonstrate proper exercise technique to PT to show competence with self management of condition.   MET  4.Decrease pain by 25% during functional activities.  Not MET    Long Term Goals (12 Weeks):     1. Increase ROM to allow improved joint biomechanics during functional activities.   2.Increase trunk and lower extremity strength to within normal limits during functional activities.   3. Independent with home exercise program.   4. Full return to functional activities with manageable complaints.  5. Patient to demonstrate improved posture and body mechanics.  6. Decrease pain by 75% during functional activities     Plan     Continue with established Plan of Care towards PT goals.       Certification Period: 4/11/17 to 7/11/17     Recommended Treatment Plan: 2-3 times per week for 12 weeks with treatments to consist of:  Neuromuscular and postural re-education,  training, therapeutic exercise, therapeutic activities,balance training, gait training, manual therapy, soft tissue mobilization, ROM exercises, Cardiovascular, Postural stabilization, manual traction, spinal mobilization, moist heat, cryotherapy, electrical stimulation, ultrasound, home exercise education and planning.    Therapist: Yan Nelson, PT  5/18/2017

## 2017-05-25 ENCOUNTER — CLINICAL SUPPORT (OUTPATIENT)
Dept: REHABILITATION | Facility: HOSPITAL | Age: 47
End: 2017-05-25
Attending: ORTHOPAEDIC SURGERY
Payer: COMMERCIAL

## 2017-05-25 DIAGNOSIS — M76.62 ACHILLES TENDINITIS OF LEFT LOWER EXTREMITY: Primary | ICD-10-CM

## 2017-05-25 DIAGNOSIS — M25.60 JOINT STIFFNESS: ICD-10-CM

## 2017-05-25 DIAGNOSIS — M25.571 CHRONIC PAIN OF RIGHT ANKLE: ICD-10-CM

## 2017-05-25 DIAGNOSIS — G89.29 CHRONIC PAIN OF RIGHT ANKLE: ICD-10-CM

## 2017-05-25 DIAGNOSIS — R26.2 DIFFICULTY WALKING: ICD-10-CM

## 2017-05-25 DIAGNOSIS — M62.81 MUSCLE WEAKNESS: ICD-10-CM

## 2017-05-25 PROCEDURE — 97110 THERAPEUTIC EXERCISES: CPT | Mod: PN

## 2017-05-25 NOTE — PROGRESS NOTES
Physical Therapy Daily Note     Name: Steph Sterling  Clinic Number: [id number]  Diagnosis:   Encounter Diagnoses   Name Primary?    Achilles tendinitis of left lower extremity Yes    Difficulty walking     Joint stiffness     Muscle weakness     Chronic pain of right ankle      Priority Start Date Expiration Date Referral Entered By   Routine 03/24/2017 12/31/2017 Bunny Saravia MD      Visits Requested Visits Authorized Visits Completed Visits Scheduled   1 25 7           Time In: 4:30  Time Out: 5:30  Total Treatment Time: 60 minutes (1:1 with PT for 30 minutes of treatment session)     Subjective     Pt states the heel pain isn't much better but isn't getting much worse.   Pain Scale: Steph rates pain on a scale of 0-10 to be 5-6 currently.      Objective     Steph received individual therapeutic exercises to develop strength, endurance, ROM, flexibility, posture and core stabilization for 40 minutes including:  Nu step x 6 minutes   -towel stretch 20 sec x 4  -CW/CCW circles 2x10 ea direction  -ABC's x 1 trial  -4 way ankle strengthening 2x20 (gTB)  -Towel crunches 2x10, 5 s ec hold   -Seated heel raises on wedge 2x10  -Standing gastroc stretch 30 sec x 4  -Standing soleus stretch 30 sec x 4  -standing eccentric heel raises 2 up 1 down 3 x 10  -hip abd/ext with red t-band 2 x 10  -single leg balance on dynapad 20 sec x 4  -shuttle squats 1.0 bands 3 x 10, single leg     Steph received the following manual therapy techniques: Soft tissue Mobilization and Friction Massage were applied to the: left achilles/gastroc complex for 15 minutes including:    Steph received ice massage to left distal achilles x 5 minutes.    Written Home Exercises Provided: Reviewed HEP from IE.  Pt demo good understanding of the education provided. Steph demonstrated good return demonstration of activities.     Education provided re:Steph verbalized good understanding of  education provided.   No spiritual or educational barriers to learning provided    Assessment     No c/o increased discomfort with prescribed activities. Improved tolerance to STM to distal achilles and posterolateral calcaneus.   Pt demonstrates a good understanding of the education provided and a good return demonstration of activities. Pt  Requires skilled supervision to complete and progress home program.    Medical necessity is demonstrated by the following IMPAIRMENTS:  -pain   -decreased range of motion/flexibility   -decreased muscle strength   -impaired function -    -decreased ADL ability  -decreased recreational ability     Patient is making good progress towards established goals.    Short Term Goals (4 Weeks):  Updated 5/18/2017  Partially MET    1.Pt to increase strength by a 1/2 grade of muscles test to allow for improvement in functional activities such as performing chores. MET  2.Pt to improve range of motion by 25% to allow for improved functional mobility to allow for improvement in IADLs.  MET  3.Pt to report compliance with HEP and demonstrate proper exercise technique to PT to show competence with self management of condition.  MET  4.Decrease pain by 25% during functional activities.  Not MET    Long Term Goals (12 Weeks):     1. Increase ROM to allow improved joint biomechanics during functional activities.   2.Increase trunk and lower extremity strength to within normal limits during functional activities.   3. Independent with home exercise program.   4. Full return to functional activities with manageable complaints.  5. Patient to demonstrate improved posture and body mechanics.  6. Decrease pain by 75% during functional activities     Plan     Continue with established Plan of Care towards PT goals.       Certification Period: 4/11/17 to 7/11/17     Recommended Treatment Plan: 2-3 times per week for 12 weeks with treatments to consist of:  Neuromuscular and postural re-education, body   training, therapeutic exercise, therapeutic activities,balance training, gait training, manual therapy, soft tissue mobilization, ROM exercises, Cardiovascular, Postural stabilization, manual traction, spinal mobilization, moist heat, cryotherapy, electrical stimulation, ultrasound, home exercise education and planning.    Therapist: Yan Nelson, PT  5/25/2017

## 2017-05-30 ENCOUNTER — CLINICAL SUPPORT (OUTPATIENT)
Dept: REHABILITATION | Facility: HOSPITAL | Age: 47
End: 2017-05-30
Attending: ORTHOPAEDIC SURGERY
Payer: COMMERCIAL

## 2017-05-30 DIAGNOSIS — M76.62 ACHILLES TENDINITIS OF LEFT LOWER EXTREMITY: Primary | ICD-10-CM

## 2017-05-30 DIAGNOSIS — M62.81 MUSCLE WEAKNESS: ICD-10-CM

## 2017-05-30 DIAGNOSIS — M25.60 JOINT STIFFNESS: ICD-10-CM

## 2017-05-30 DIAGNOSIS — R26.2 DIFFICULTY WALKING: ICD-10-CM

## 2017-05-30 PROCEDURE — 97110 THERAPEUTIC EXERCISES: CPT | Mod: PN

## 2017-05-30 NOTE — PROGRESS NOTES
Physical Therapy Daily Note     Name: Steph Sterling  Clinic Number: [id number]  Diagnosis:   Encounter Diagnoses   Name Primary?   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 Patient states she does see improvements.   Pain Scale: Steph rates pain on a scale of 0-10 to be 7 currently.    Objective     Steph received individual therapeutic exercises to develop strength, endurance, ROM, flexibility, posture and core stabilization for 40 minutes including:  -Nu step x 6 minutes   -towel stretch 20 sec x 4  -CW/CCW circles 2x10 ea direction  -ABC's x 1 trial  -4 way ankle strengthening 2x20 (GTB)  -Towel crunches 2x10, 5 s ec hold   -Seated heel raises on wedge 2x10  -Standing gastroc stretch 30 sec x 4  -Standing soleus stretch 30 sec x 4  -standing eccentric heel raises 2 up 1 down 3 x 10  -hip abd/ext with red t-band 2 x 10  -single leg balance on dynapad 20 sec x 4  -shuttle squats 1.0 bands 3 x 10, single leg (not performed today)      Steph received the following manual therapy techniques: Soft tissue Mobilization and Friction Massage were applied to the: left achilles/gastroc complex for 10 minutes including:    Steph received cold pack to left distal achilles x 10 minutes.    Written Home Exercises Provided: Reviewed HEP from IE.  Pt demo good understanding of the education provided. Steph demonstrated good return demonstration of activities.     Education  provided re:Steph verbalized good understanding of education provided.   No spiritual or educational barriers to learning provided    Assessment     Steph tolerated treatment well today. Patient remains hypersensitive with manual therapy techniques to distal achilles and posterolateral calcaneous but improvements noted since start of skilled care. Patient demonstrates fair utilization of balance strategies in single limb stance position. Pt demonstrates a good understanding of the education provided and a good return demonstration of activities. Pt requires skilled supervision to complete and progress home program.    Medical necessity is demonstrated by the following IMPAIRMENTS:  -pain   -decreased range of motion/flexibility   -decreased muscle strength   -impaired function -    -decreased ADL ability  -decreased recreational ability     Patient is making good progress towards established goals.    Short Term Goals (4 Weeks):  Updated 5/18/2017  Partially MET    1.Pt to increase strength by a 1/2 grade of muscles test to allow for improvement in functional activities such as performing chores. MET  2.Pt to improve range of motion by 25% to allow for improved functional mobility to allow for improvement in IADLs.  MET  3.Pt to report compliance with HEP and demonstrate proper exercise technique to PT to show competence with self management of condition.  MET  4.Decrease pain by 25% during functional activities.  Not MET    Long Term Goals (12 Weeks):     1. Increase ROM to allow improved joint biomechanics during functional activities.   2.Increase trunk and lower extremity strength to within normal limits during functional activities.   3. Independent with home exercise program.   4. Full return to functional activities with manageable complaints.  5. Patient to demonstrate improved posture and body mechanics.  6. Decrease pain by 75% during functional activities     Plan     Continue with established Plan of  Care towards PT goals.       Certification Period: 4/11/17 to 7/11/17     Recommended Treatment Plan: 2-3 times per week for 12 weeks with treatments to consist of:  Neuromuscular and postural re-education,  training, therapeutic exercise, therapeutic activities,balance training, gait training, manual therapy, soft tissue mobilization, ROM exercises, Cardiovascular, Postural stabilization, manual traction, spinal mobilization, moist heat, cryotherapy, electrical stimulation, ultrasound, home exercise education and planning.    Therapist: Amina Epperson, PTA  5/30/2017

## 2017-06-06 ENCOUNTER — PATIENT MESSAGE (OUTPATIENT)
Dept: ADMINISTRATIVE | Facility: OTHER | Age: 47
End: 2017-06-06

## 2017-06-09 ENCOUNTER — CLINICAL SUPPORT (OUTPATIENT)
Dept: REHABILITATION | Facility: HOSPITAL | Age: 47
End: 2017-06-09
Attending: ORTHOPAEDIC SURGERY
Payer: COMMERCIAL

## 2017-06-09 DIAGNOSIS — M25.60 JOINT STIFFNESS: ICD-10-CM

## 2017-06-09 DIAGNOSIS — M62.81 MUSCLE WEAKNESS: ICD-10-CM

## 2017-06-09 DIAGNOSIS — R26.2 DIFFICULTY WALKING: ICD-10-CM

## 2017-06-09 DIAGNOSIS — M76.62 ACHILLES TENDINITIS OF LEFT LOWER EXTREMITY: Primary | ICD-10-CM

## 2017-06-09 PROCEDURE — 97110 THERAPEUTIC EXERCISES: CPT | Mod: PN

## 2017-06-09 PROCEDURE — 97140 MANUAL THERAPY 1/> REGIONS: CPT | Mod: PN

## 2017-06-09 NOTE — PROGRESS NOTES
Physical Therapy Daily Note     Name: Steph Sterling  Clinic Number: [id number]  Diagnosis:   Encounter Diagnoses   Name Primary?    Achilles tendinitis of left lower extremity Yes    Difficulty walking     Joint stiffness     Muscle weakness      Priority Start Date Expiration Date Referral Entered By   Routine 03/24/2017 12/31/2017 Bunny Saravia MD      Visits Requested Visits Authorized Visits Completed Visits Scheduled   1 25 8           Time In: 8:30  Time Out: 9:25  Total Treatment Time: 55 minutes     Subjective     Pt states the achilles is doing a little better with therapy. States she is 70% of normal.   Pain Scale: Steph rates pain on a scale of 0-10 to be 4 currently.    Objective     Steph received individual therapeutic exercises to develop strength, endurance, ROM, flexibility, posture and core stabilization for 40 minutes including:  -Nu step x 6 minutes   -towel stretch 20 sec x 4  -CW/CCW circles 2x10 ea direction  -ABC's x 1 trial  -4 way ankle strengthening 2x20 (GTB)  -Towel crunches 2x10, 5 s ec hold   -Seated heel raises on wedge 2x10  -Standing gastroc stretch 30 sec x 4  -Standing soleus stretch 30 sec x 4  -standing eccentric heel raises 2 up 1 down 3 x 10  -hip abd/ext with red t-band 2 x 10  -single leg balance on dynapad 20 sec x 4  -shuttle squats 2.0 bands 3 x 10, double leg     Steph received the following manual therapy techniques: Soft tissue Mobilization and Friction Massage were applied to the: left achilles/gastroc complex for 10 minutes including:    Steph received cold pack to left distal achilles x 10 minutes.    Written Home Exercises Provided: Reviewed HEP from IE.  Pt demo good understanding of the education provided. Steph demonstrated good return demonstration of activities.     Education provided re:Steph verbalized good understanding of education provided.   No spiritual or educational barriers to  learning provided    Assessment     No c/o increased discomfort with prescribed activities. Demonstrates improved ankle strength in all planes.  Pt demonstrates a good understanding of the education provided and a good return demonstration of activities. Pt requires skilled supervision to complete and progress home program.    Medical necessity is demonstrated by the following IMPAIRMENTS:  -pain   -decreased range of motion/flexibility   -decreased muscle strength   -impaired function -    -decreased ADL ability  -decreased recreational ability     Patient is making good progress towards established goals.    Short Term Goals (4 Weeks):  Updated 5/18/2017  Partially MET    1.Pt to increase strength by a 1/2 grade of muscles test to allow for improvement in functional activities such as performing chores. MET  2.Pt to improve range of motion by 25% to allow for improved functional mobility to allow for improvement in IADLs.  MET  3.Pt to report compliance with HEP and demonstrate proper exercise technique to PT to show competence with self management of condition.  MET  4.Decrease pain by 25% during functional activities.  Not MET    Long Term Goals (12 Weeks):     1. Increase ROM to allow improved joint biomechanics during functional activities.   2.Increase trunk and lower extremity strength to within normal limits during functional activities.   3. Independent with home exercise program.   4. Full return to functional activities with manageable complaints.  5. Patient to demonstrate improved posture and body mechanics.  6. Decrease pain by 75% during functional activities    CMS Impairment/Limitation/Restriction for FOTO Ankle Survey  Status Limitation G-Code CMS Severity Modifier  Intake 30% 70%  Predicted 56% 44% Goal Status+ CK - At least 40 percent but less than 60 percent  6/9/2017 37% 63% Current Status CL - At least 60 percent but less than 80 percent     Plan     Continue with established Plan of Care  towards PT goals.       Certification Period: 4/11/17 to 7/11/17     Recommended Treatment Plan: 2-3 times per week for 12 weeks with treatments to consist of:  Neuromuscular and postural re-education,  training, therapeutic exercise, therapeutic activities,balance training, gait training, manual therapy, soft tissue mobilization, ROM exercises, Cardiovascular, Postural stabilization, manual traction, spinal mobilization, moist heat, cryotherapy, electrical stimulation, ultrasound, home exercise education and planning.    Therapist: Yan Nelson, PT  6/9/2017

## 2017-06-27 ENCOUNTER — PATIENT MESSAGE (OUTPATIENT)
Dept: ADMINISTRATIVE | Facility: OTHER | Age: 47
End: 2017-06-27

## 2017-07-03 ENCOUNTER — OFFICE VISIT (OUTPATIENT)
Dept: OPTOMETRY | Facility: CLINIC | Age: 47
End: 2017-07-03
Payer: COMMERCIAL

## 2017-07-03 DIAGNOSIS — Z46.0 ENCOUNTER FOR FITTING OR ADJUSTMENT OF SPECTACLES OR CONTACT LENSES: Primary | ICD-10-CM

## 2017-07-03 DIAGNOSIS — H52.7 REFRACTIVE ERROR: ICD-10-CM

## 2017-07-03 DIAGNOSIS — H25.13 NUCLEAR SCLEROSIS, BILATERAL: Primary | ICD-10-CM

## 2017-07-03 PROCEDURE — 99499 UNLISTED E&M SERVICE: CPT | Mod: S$GLB,,, | Performed by: OPTOMETRIST

## 2017-07-03 PROCEDURE — 99999 PR PBB SHADOW E&M-EST. PATIENT-LVL I: CPT | Mod: PBBFAC,,, | Performed by: OPTOMETRIST

## 2017-07-03 PROCEDURE — 92310 CONTACT LENS FITTING OU: CPT | Mod: ,,, | Performed by: OPTOMETRIST

## 2017-07-03 PROCEDURE — 92015 DETERMINE REFRACTIVE STATE: CPT | Mod: S$GLB,,, | Performed by: OPTOMETRIST

## 2017-07-03 PROCEDURE — 92004 COMPRE OPH EXAM NEW PT 1/>: CPT | Mod: S$GLB,,, | Performed by: OPTOMETRIST

## 2017-07-03 NOTE — PROGRESS NOTES
Assessment /Plan     For exam results, see Encounter Report.    Encounter for fitting or adjustment of spectacles or contact lenses      Contact lens exam done, see exam of same date for documentation.

## 2017-07-04 NOTE — PROGRESS NOTES
Subjective:       Patient ID: Steph Sterling is a 47 y.o. female      Chief Complaint   Patient presents with    Concerns About Ocular Health    Contact Lens Fit     History of Present Illness  Referred by Dr.Craig Quinn  Patient states OU decrease vision at dist w/contact lens. Update   eyeglasses Rx.  Takes contacts lens out nightly and throw away mainly q 2months.  No pain or f/f.  No eyedrops.    Sofmed 8.5 14.2 -3.00 OU. Does not sleep in lenses.      Assessment/Plan:     1. Nuclear sclerosis, bilateral  Educated pt on presence of cataracts and effects on vision. No surgery at this time. Recheck in one year.    2. Refractive error  Educated patient on refractive error and discussed lens options. Dispensed updated spectacle Rx. Educated about adaptation period to new specs.    Eyeglass Final Rx     Eyeglass Final Rx       Sphere Cylinder Axis Add    Right -3.50 +0.50 115 +1.25    Left -3.00 Sphere  +1.25    Type:  Bifocal/PAL    Expiration Date:  7/4/2018                Pt wants to switch brand. Did not like comfort of Softlens. Good fit and [facility] with Bobbi contact lenses in offices. Pt to trial lenses before ordering. Pt wanted to keep same Rx OU. Pt understands OD -0.25 undercorrected to keep same Rx as OS. Distance only lenses with OTC readers for near as needed. Contact lens Rx released to pt. Daily wear only advised, replacement monthly with education to risks of extended wear.  Discussed lens care, compliance and solutions. RTC yearly contact lens follow up.     Contact Lens Final Rx     Final Contact Lens Rx       Brand Base Curve Diameter Sphere Cylinder    Right Acuvue Bobbi 8.4 14.0 -3.00 Sphere    Left Acuvue Bobbi 8.4 14.0 -3.00 Sphere    Expiration Date:  7/4/2018    Replacement:  Monthly    Solutions:  OptiFree PureMoist    Wearing Schedule:  Daily wear                  Return in about 1 year (around 7/3/2018) for Annual eye exam, Contact Lens Follow Up.

## 2017-07-16 ENCOUNTER — PATIENT MESSAGE (OUTPATIENT)
Dept: ADMINISTRATIVE | Facility: OTHER | Age: 47
End: 2017-07-16

## 2017-07-17 ENCOUNTER — PATIENT MESSAGE (OUTPATIENT)
Dept: ADMINISTRATIVE | Facility: OTHER | Age: 47
End: 2017-07-17

## 2017-08-01 ENCOUNTER — OFFICE VISIT (OUTPATIENT)
Dept: OBSTETRICS AND GYNECOLOGY | Facility: CLINIC | Age: 47
End: 2017-08-01
Payer: COMMERCIAL

## 2017-08-01 VITALS
HEIGHT: 66 IN | WEIGHT: 293 LBS | BODY MASS INDEX: 47.09 KG/M2 | DIASTOLIC BLOOD PRESSURE: 77 MMHG | SYSTOLIC BLOOD PRESSURE: 127 MMHG

## 2017-08-01 DIAGNOSIS — R10.2 PELVIC PAIN IN FEMALE: ICD-10-CM

## 2017-08-01 DIAGNOSIS — Z00.00 ANNUAL PHYSICAL EXAM: Primary | ICD-10-CM

## 2017-08-01 DIAGNOSIS — N83.209 OVARIAN CYST: ICD-10-CM

## 2017-08-01 DIAGNOSIS — Z30.9 ENCOUNTER FOR CONTRACEPTIVE MANAGEMENT, UNSPECIFIED TYPE: ICD-10-CM

## 2017-08-01 PROCEDURE — 99396 PREV VISIT EST AGE 40-64: CPT | Mod: S$GLB,,, | Performed by: OBSTETRICS & GYNECOLOGY

## 2017-08-01 PROCEDURE — 99999 PR PBB SHADOW E&M-EST. PATIENT-LVL III: CPT | Mod: PBBFAC,,, | Performed by: OBSTETRICS & GYNECOLOGY

## 2017-08-01 NOTE — PROGRESS NOTES
Subjective:      Chief Complaint:  OVARIAN   CYST   PELVIC   PRESSURE    Menstrual History:    OB History      Para Term  AB Living    1 1       1    SAB TAB Ectopic Multiple Live Births                       Menarche age: 14     No LMP recorded. Patient has had an implant.           Objective:        History of Present Illness AND  Examination detailed DICTATE:       HISTORY OF PRESENT ILLNESS:  The patient is 47 years of age.  The patient is   here for annual exam.  The patient is a  1, para 1.  Pap smear in 2015   was negative.  The patient has a Mirena IUD inserted three years ago.  Review of   problem list, increased blood pressure, tendinitis surgery, Achilles tendon   repair.  The patient was found to have ovarian cyst on the left side, which was   interpreted by the radiologist as possible cystic teratoma.  The patient is   complaining of mild pelvic cramping, increased urinary frequency, mild pressure   and discomfort with sexual activity.    REVIEW OF SYSTEMS:  HEAD, EAR, EYES, NOSE, AND THROAT:  Negative.  CARDIORESPIRATORY:  Negative.  GASTROINTESTINAL:  Negative.  GENITOURINARY:  See present illness.  NEUROMUSCULAR:  No problem.    PHYSICAL EXAMINATION:  GENERAL:  Well-developed, well-nourished, alert, oriented female, not in acute   distress.  VITAL SIGNS:  Blood pressure 127/77, weight 296.  HEAD:  Normocephalic.  EYES:  React.  NECK:  Supple.  Thyroid is not palpable.  No nodes.  CHEST:  Clear.  HEART:  Regular sinus rhythm, no murmur.  BREASTS:  No lumps, masses, discharge, skin changes, retraction, nipple changes.    Axilla negative.  ABDOMEN:  Upper abdomen normal.  Lower abdomen normal.  No guarding, rebound or   tenderness.  Bowel sounds normal.  PELVIC:  External normal.  Vulva normal.  [place], urethral and [place] glands   are negative.  Vagina is clear.  Cervix clear.  IUD strings noted.  Uterus,   normal size.  Right adnexa negative.  Left, however, is enlarged,  possible large   than the last time, may be 5 x 6 cm.  Slight tenderness when the left ovary was   touched.  Good pelvic support.  No descensus.  RECTAL:  External negative.  MUSCULOSKELETAL:  Negative.  NEUROLOGIC:  Grossly normal.    IMPRESSION:  Ovarian cysts, possible cystic teratoma.    PLAN:  Repeat pelvic ultrasound.  Recommendation multivitamin, calcium and   vitamin D.  Increased physical activity and possible weight loss; however,   probably because of tendinitis, the patient is not able to be very active.  We   will see the patient back after the ultrasound and then we will decide if any   followup is needed.  CA-125 two years ago was negative.      REECE/ASHLEY  dd: 08/01/2017 14:51:55 (CDT)  td: 08/02/2017 00:59:24 (CDT)  Doc ID   #[id number]  Job ID #[id number]    CC:           Assessment:      Diagnosis: ANNUAL  EXAM    GYN   EXAM   OVARIAN   CYST       Plan:      Return in 12  months

## 2017-08-01 NOTE — PATIENT INSTRUCTIONS

## 2017-08-03 ENCOUNTER — HOSPITAL ENCOUNTER (OUTPATIENT)
Dept: RADIOLOGY | Facility: HOSPITAL | Age: 47
Discharge: HOME | End: 2017-08-03
Attending: OBSTETRICS & GYNECOLOGY
Payer: COMMERCIAL

## 2017-08-03 DIAGNOSIS — N83.209 OVARIAN CYST: ICD-10-CM

## 2017-08-03 DIAGNOSIS — R10.2 PELVIC PAIN IN FEMALE: ICD-10-CM

## 2017-08-03 PROCEDURE — 76856 US EXAM PELVIC COMPLETE: CPT | Mod: TC

## 2017-08-03 PROCEDURE — 76830 TRANSVAGINAL US NON-OB: CPT | Mod: 26,,, | Performed by: RADIOLOGY

## 2017-08-03 PROCEDURE — 76856 US EXAM PELVIC COMPLETE: CPT | Mod: 26,,, | Performed by: RADIOLOGY

## 2017-08-04 ENCOUNTER — TELEPHONE (OUTPATIENT)
Dept: OBSTETRICS AND GYNECOLOGY | Facility: CLINIC | Age: 47
End: 2017-08-04

## 2017-08-04 NOTE — TELEPHONE ENCOUNTER
----- Message from Ridge Vines MD sent at 8/3/2017  3:27 PM CDT -----  Please make an appointment. I would like to see you in my office.

## 2017-08-09 ENCOUNTER — TELEPHONE (OUTPATIENT)
Dept: OBSTETRICS AND GYNECOLOGY | Facility: CLINIC | Age: 47
End: 2017-08-09

## 2017-08-09 NOTE — TELEPHONE ENCOUNTER
----- Message from Lamar Jiang sent at 8/9/2017  1:22 PM CDT -----  Contact: Self  Pt returned call. Pt can be reached @ [phone].

## 2017-08-23 ENCOUNTER — OFFICE VISIT (OUTPATIENT)
Dept: OBSTETRICS AND GYNECOLOGY | Facility: CLINIC | Age: 47
End: 2017-08-23
Payer: COMMERCIAL

## 2017-08-23 VITALS
SYSTOLIC BLOOD PRESSURE: 139 MMHG | DIASTOLIC BLOOD PRESSURE: 78 MMHG | WEIGHT: 293 LBS | HEIGHT: 66 IN | BODY MASS INDEX: 47.09 KG/M2

## 2017-08-23 DIAGNOSIS — N83.209 OVARIAN CYST: ICD-10-CM

## 2017-08-23 DIAGNOSIS — D36.9 DERMOID: ICD-10-CM

## 2017-08-23 DIAGNOSIS — R10.2 PELVIC PAIN IN FEMALE: Primary | ICD-10-CM

## 2017-08-23 PROCEDURE — 3008F BODY MASS INDEX DOCD: CPT | Mod: S$GLB,,, | Performed by: OBSTETRICS & GYNECOLOGY

## 2017-08-23 PROCEDURE — 99212 OFFICE O/P EST SF 10 MIN: CPT | Mod: S$GLB,,, | Performed by: OBSTETRICS & GYNECOLOGY

## 2017-08-23 PROCEDURE — 3075F SYST BP GE 130 - 139MM HG: CPT | Mod: S$GLB,,, | Performed by: OBSTETRICS & GYNECOLOGY

## 2017-08-23 PROCEDURE — 3078F DIAST BP <80 MM HG: CPT | Mod: S$GLB,,, | Performed by: OBSTETRICS & GYNECOLOGY

## 2017-08-23 PROCEDURE — 99999 PR PBB SHADOW E&M-EST. PATIENT-LVL III: CPT | Mod: PBBFAC,,, | Performed by: OBSTETRICS & GYNECOLOGY

## 2017-08-23 NOTE — PROGRESS NOTES
Subjective:      Chief Complaint    Menstrual History:    OB History      Para Term  AB Living    1 1       1    SAB TAB Ectopic Multiple Live Births                       Menarche age: 14     No LMP recorded. Patient has had an implant.           Objective:        History of Present Illness AND  Examination detailed DICTATE:     The patient is 47 years of age.  The patient is a  1, para 1.  The   patient was here previously for annual exam, was found to have a pelvic ovarian   mass about 2.8 x 3 cm, previously was none to be present, diagnosed by   pathologist, probably cystic teratoma.  The patient; however, lately have some   symptoms, pressure, discomfort and mild pain in the pelvic area.  Repeat   ultrasound again found the same left ovarian mass about same size, previous   CA-125 was negative.  We will do an MRI to confirm the diagnosis,  repeat the CA-125 and then we will decide on followup or possible treatment.  My   recommendation is possibly removal of the ovary with teratoma and possible do a   tubal ligation at the same time.  We will make this decision once we receive   MRI report and CA-125.      REECE/IN  dd: 2017 13:16:06 (CDT)  td: 2017 01:08:41 (CDT)  Doc ID   #[id number]  Job ID #[id number]    CC:             Assessment:      Diagnosis: ovarian  mass   teratoma       Plan:      Return in 2  weeks

## 2017-08-28 ENCOUNTER — PATIENT MESSAGE (OUTPATIENT)
Dept: ADMINISTRATIVE | Facility: OTHER | Age: 47
End: 2017-08-28

## 2017-08-28 ENCOUNTER — HOSPITAL ENCOUNTER (OUTPATIENT)
Dept: RADIOLOGY | Facility: HOSPITAL | Age: 47
Discharge: HOME | End: 2017-08-28
Attending: OBSTETRICS & GYNECOLOGY
Payer: COMMERCIAL

## 2017-08-28 ENCOUNTER — TELEPHONE (OUTPATIENT)
Dept: OBSTETRICS AND GYNECOLOGY | Facility: CLINIC | Age: 47
End: 2017-08-28

## 2017-08-28 DIAGNOSIS — N83.209 OVARIAN CYST: ICD-10-CM

## 2017-08-28 DIAGNOSIS — D36.9 DERMOID: ICD-10-CM

## 2017-08-28 PROCEDURE — 72197 MRI PELVIS W/O & W/DYE: CPT | Mod: TC

## 2017-08-28 PROCEDURE — 72197 MRI PELVIS W/O & W/DYE: CPT | Mod: 26,,, | Performed by: RADIOLOGY

## 2017-08-28 PROCEDURE — 25500020 PHARM REV CODE 255: Performed by: OBSTETRICS & GYNECOLOGY

## 2017-08-28 PROCEDURE — A9585 GADOBUTROL INJECTION: HCPCS | Performed by: OBSTETRICS & GYNECOLOGY

## 2017-08-28 RX ORDER — GADOBUTROL 604.72 MG/ML
10 INJECTION INTRAVENOUS
Status: COMPLETED | OUTPATIENT
Start: 2017-08-28 | End: 2017-08-28

## 2017-08-28 RX ADMIN — GADOBUTROL 10 ML: 604.72 INJECTION INTRAVENOUS at 10:08

## 2017-08-28 NOTE — TELEPHONE ENCOUNTER
----- Message from Hay Kern sent at 8/28/2017  4:09 PM CDT -----  Contact: [phone]  Returning phone call

## 2017-08-29 ENCOUNTER — OFFICE VISIT (OUTPATIENT)
Dept: OBSTETRICS AND GYNECOLOGY | Facility: CLINIC | Age: 47
End: 2017-08-29
Payer: COMMERCIAL

## 2017-08-29 VITALS
BODY MASS INDEX: 47.09 KG/M2 | DIASTOLIC BLOOD PRESSURE: 78 MMHG | WEIGHT: 293 LBS | SYSTOLIC BLOOD PRESSURE: 125 MMHG | HEIGHT: 66 IN

## 2017-08-29 DIAGNOSIS — D36.9 DERMOID TUMOR: ICD-10-CM

## 2017-08-29 DIAGNOSIS — N39.0 URINARY TRACT INFECTION, SITE NOT SPECIFIED: Primary | ICD-10-CM

## 2017-08-29 LAB
BILIRUB SERPL-MCNC: ABNORMAL MG/DL
BLOOD URINE, POC: ABNORMAL
COLOR, POC UA: YELLOW
GLUCOSE UR QL STRIP: ABNORMAL
KETONES UR QL STRIP: ABNORMAL
LEUKOCYTE ESTERASE URINE, POC: ABNORMAL
NITRITE, POC UA: ABNORMAL
PH, POC UA: ABNORMAL
PROTEIN, POC: ABNORMAL
SPECIFIC GRAVITY, POC UA: ABNORMAL
UROBILINOGEN, POC UA: ABNORMAL

## 2017-08-29 PROCEDURE — 3078F DIAST BP <80 MM HG: CPT | Mod: S$GLB,,, | Performed by: OBSTETRICS & GYNECOLOGY

## 2017-08-29 PROCEDURE — 81002 URINALYSIS NONAUTO W/O SCOPE: CPT | Mod: S$GLB,,, | Performed by: OBSTETRICS & GYNECOLOGY

## 2017-08-29 PROCEDURE — 3074F SYST BP LT 130 MM HG: CPT | Mod: S$GLB,,, | Performed by: OBSTETRICS & GYNECOLOGY

## 2017-08-29 PROCEDURE — 87086 URINE CULTURE/COLONY COUNT: CPT

## 2017-08-29 PROCEDURE — 99999 PR PBB SHADOW E&M-EST. PATIENT-LVL III: CPT | Mod: PBBFAC,,, | Performed by: OBSTETRICS & GYNECOLOGY

## 2017-08-29 PROCEDURE — 3008F BODY MASS INDEX DOCD: CPT | Mod: S$GLB,,, | Performed by: OBSTETRICS & GYNECOLOGY

## 2017-08-29 PROCEDURE — 99213 OFFICE O/P EST LOW 20 MIN: CPT | Mod: 25,S$GLB,, | Performed by: OBSTETRICS & GYNECOLOGY

## 2017-08-29 RX ORDER — CIPROFLOXACIN 500 MG/1
500 TABLET ORAL 2 TIMES DAILY
Qty: 14 TABLET | Refills: 0 | Status: SHIPPED | OUTPATIENT
Start: 2017-08-29 | End: 2017-09-01

## 2017-08-29 NOTE — PROGRESS NOTES
Subjective:      Chief Complaint:  DERMOID  Chief Complaint   Patient presents with    Results       Menstrual History:    OB History      Para Term  AB Living    1 1       1    SAB TAB Ectopic Multiple Live Births                       Menarche age: 14     No LMP recorded. Patient has had an implant.           Objective:        History of Present Illness AND  Examination detailed DICTATE:       The patient is here for consultation.  The patient was found to have in the past   examination, a cystic teratoma.  On the left side, a repeat ultrasound and MRI   indicated slight increase in the tumor, now the patient developed some symptoms,   pelvic pain, discomfort, pressure sensation also urgency and frequency.    Reviewing the patient's records, she is not a diabetic.  Hemoglobin A1c is   normal.  Comprehensive metabolic panel normal.  CBC is normal.  I discussed with   the patient nature of her condition.  Recommendation possible laparoscopic   removal of the left ovary or the dermoid.  Also, discussed with the patient of   possible tubal and thermal ablation.  At the present, the patient has a Mirena   which may be left in.  Her urinalysis indicated red blood cells and leukocytes,   most likely urinary tract infection.  We will treat it with Cipro.  We will   refer the patient for surgical consultation for Dr. Marrero for the possible   removal of the dermoid.  The diagnosis is dermoid cyst and urinary tract   infection.      REECE/IN  dd: 2017 10:11:24 (CDT)  td: 2017 00:57:39 (CDT)  Doc ID   #[id number]  Job ID #[id number]    CC:         Assessment:      Diagnosis:   DERMOID   UTI       Plan:      Return in 4  weeks

## 2017-08-30 ENCOUNTER — PATIENT OUTREACH (OUTPATIENT)
Dept: OTHER | Facility: OTHER | Age: 47
End: 2017-08-30

## 2017-08-30 NOTE — LETTER
Sheba Lama, Margret  [address] [place] [address]     Dear Steph Sterling,    Welcome to the [facility]!         My name is Sheba Lama PharmD and I am your dedicated Digital Medicine clinician.  As an expert in medication management, I will help ensure that the medications you are taking continue to provide you with the intended benefits.      I am Jeanine Dai and I will be your health  for the duration of the program.  My  job is to help you identify lifestyle changes to improve your blood pressure control.  We will talk about nutrition, exercise, and other ways that you may be able to adjust your current habits to better your health. Together, we will work to improve your overall health and encourage you to meet your goals for a healthier lifestyle.    What we expect from YOU:    You will need to take blood pressure readings multiple times a week and no less than one reading per week.   It is important that you take your measurements at different times during the day, when possible.     What you should expect from your Digital Medicine Care Team:   We will provide you with education about high blood pressure, including lifestyle changes that could help you to control your blood pressure.   We will review your weekly readings and provide you with monthly blood pressure progress reports after you have been in the program for more than 30 days.   We will send monthly progress reports on your blood pressure control to your physician so they can follow along with your progress as well.    You will be able to reach me by phone at [phone] or through your [facility] account by clicking my name under Care Team on the right side of the home screen.    I look forward to working with you to achieve your blood pressure goals!    Sincerely,    Sheba Lama PharmD  Your personal clinician    Please visit  [email or web address] to learn more about high blood pressure and what you can do lower your blood pressure.                                                                                           Steph Sterling  [address] [place] [address]

## 2017-08-31 LAB — BACTERIA UR CULT: NORMAL

## 2017-09-05 ENCOUNTER — OFFICE VISIT (OUTPATIENT)
Dept: OBSTETRICS AND GYNECOLOGY | Facility: CLINIC | Age: 47
End: 2017-09-05
Payer: COMMERCIAL

## 2017-09-05 VITALS
DIASTOLIC BLOOD PRESSURE: 74 MMHG | BODY MASS INDEX: 47.09 KG/M2 | SYSTOLIC BLOOD PRESSURE: 126 MMHG | HEIGHT: 66 IN | WEIGHT: 293 LBS

## 2017-09-05 DIAGNOSIS — D36.9 DERMOID TUMOR: Primary | ICD-10-CM

## 2017-09-05 DIAGNOSIS — Z30.2 STERILIZATION: ICD-10-CM

## 2017-09-05 PROCEDURE — 3008F BODY MASS INDEX DOCD: CPT | Mod: S$GLB,,, | Performed by: OBSTETRICS & GYNECOLOGY

## 2017-09-05 PROCEDURE — 3074F SYST BP LT 130 MM HG: CPT | Mod: S$GLB,,, | Performed by: OBSTETRICS & GYNECOLOGY

## 2017-09-05 PROCEDURE — 99213 OFFICE O/P EST LOW 20 MIN: CPT | Mod: S$GLB,,, | Performed by: OBSTETRICS & GYNECOLOGY

## 2017-09-05 PROCEDURE — 99999 PR PBB SHADOW E&M-EST. PATIENT-LVL III: CPT | Mod: PBBFAC,,, | Performed by: OBSTETRICS & GYNECOLOGY

## 2017-09-05 PROCEDURE — 3078F DIAST BP <80 MM HG: CPT | Mod: S$GLB,,, | Performed by: OBSTETRICS & GYNECOLOGY

## 2017-09-05 NOTE — PROGRESS NOTES
"Cc:  Referral from Dr. Vines    HPI:  47 yro  who currently has Mirena IUD presents on referral from Dr. Vines for L dermoid cyst.  Pt desires L oopherectomy and pt states she would also like to get her "tubes tied."  Pt denies any bleeding issues at this time because they have been resolved with Mirena.    Pelvic sono:  Results: Transabdominal and transvaginal pelvic ultrasound performed.  The uterus measures 7.7 cm in length and 4.4 x 4.5 cm in transverse dimensions.  The endometrium is normal thickness measuring 3 mm.   No discrete uterine fibroids identified.  The ovaries are normal in size.  The right ovary measures 3.6 x 2.3 x 2.8 cm.  The left ovary measures 2.6 x 2.5 x 2.4 cm. there is a small hyperechoic mass in the left ovary measuring 1.6 x 2.2 x 1.6 cm, unchanged. Doppler flow demonstrated to both ovaries.  No free fluid. Nabothian cysts noted.   Impression     Normal sized left ovary containing a small hyperechoic area, similar to the prior exams, possible dermoid.     Pelvic MRI  The uterus measures 8.4 x 3.0 x 5.0 cm.  No uterine fibroids identified.  The right ovary measures approximately 2.9 x 1.7 x 3.2 cm and contains a small follicle.  The left ovary measures 4.9 x 3.2 x 3.1 cm and contains a a 4.1 x 2.0 x 1.9 cm mostly fatty mass likely a dermoid.  There is also a small hemorrhagic cyst measuring 1.2 cm.  There is no free fluid.  No pelvic adenopathy.  The remainder of the visualized soft tissues appear normal.   Impression      Small fatty mass on to the left ovary, likely an ovarian dermoid     30 minute discussion  Discussed doing bilateral salpingectomy instead of mere tubal sterilization.  Discussed current recommendations to help prevent future ovarian cancer.  DIscussed LSO as well.  Pt understands and desires to proceed.  Pt has no uterine issues at this time, and should they arise, they will be addressed then.  Pt's medical and surgical hx reviewed.    Assessment/Plan  1. Dermoid " tumor  Case Request Operating Room: OOPHORECTOMY-LAPAROSCOPIC with bilateral salpingectomy   2. Sterilization  Case Request Operating Room: OOPHORECTOMY-LAPAROSCOPIC with bilateral salpingectomy     LSO with R salingectomy scheduled for 11/15/17 with pre-op a few days prior.

## 2017-09-05 NOTE — LETTER
September 5, 2017      Ridge Vines MD  [address]  [address]  [place] [address]           [facility]  [address] [facility] [address]  [facility] [place] [address]  Phone: [phone]          Patient: Steph Sterling   MR Number: [id number]   YOB: 1970   Date of Visit: 9/5/2017       Dear Dr. Ridge Vines:    Thank you for referring Steph Sterling to me for evaluation. Attached you will find relevant portions of my assessment and plan of care.    If you have questions, please do not hesitate to call me. I look forward to following Steph Sterling along with you.    Sincerely,    Monroe Marrero MD    Enclosure  CC:  No Recipients    If you would like to receive this communication electronically, please contact [email or web address] or [phone] to request more information on [email or web address] Link access.    For providers and/or their staff who would like to refer a patient to [facility], please contact us through our one-stop-shop provider referral line, [facility] , at [phone].    If you feel you have received this communication in error or would no longer like to receive these types of communications, please e-mail [email or web address]

## 2017-09-12 NOTE — TELEPHONE ENCOUNTER
HTN Digital Medicine Program Medication Reconciliation Outreach    Called patient to introduce her into the HDMP. Reviewed program details including blood pressure goals, technique for taking readings (timing, cuff placement, body positioning, [employer] robert), and what to do in case of emergency. Introduced patient to members of care team (health , clinician, and responsible provider). Verified patient's understanding of [facility] My[facility] robert use for contacting clinical team and to ensure that iHealth cuff readings continue to transmit by logging in once every 2 weeks. Confirmation text sent and patient received.  Has not taken BP reading since 9/9/17. Had to travel to [place] to help niece evacuate and did not have BP cuff with her at the time. Will continue taking regular readings at different times of day. Mobic and Robaxin are both used PRN at this time. Reports taking Verapamil 240mg in AM between 7-730a.  Has experienced headache a couple of times in the last week. Occasional migraines and may take Excedrin migraine at home.    Screening Questionnaire Review:  1. Depression - not indicated  2. Sleep apnea - not indicated     Depression screening results suggest patient is having depressive symptoms. Patient is not being followed and is not interested in referral.  No concerns r/t depressive symptoms at present.      Verified the following information with the patient:  1. Medication list  Current Outpatient Prescriptions on File Prior to Visit   Medication Sig Dispense Refill    meloxicam (MOBIC) 15 MG tablet Take 1 tablet (15 mg total) by mouth daily as needed for Pain. 30 tablet 3    methocarbamol (ROBAXIN) 750 MG Tab Take 1 tablet (750 mg total) by mouth 3 (three) times daily. 60 tablet 0    verapamil (VERELAN) 240 MG C24P Take 1 capsule (240 mg total) by mouth once daily. 90 capsule 3     No current facility-administered medications on file prior to visit.        Previous ADRs  NKDA    2. Medication  compliance: has been compliant with the medicaiton regimen    3. Medication Allergies: Review of patient's allergies indicates:  No Known Allergies    Explained that our goal is to get her BP consistently below 140/90mmHg.  Patient denies having further questions, concerns. BP is at goal.       Last 5 Patient Entered Redings Current 30 Day Average: 138/87     Recent Readings 9/9/2017 9/8/2017 9/7/2017 9/6/2017 9/5/2017    Systolic BP (mmHg) 138 128 140 134 149    Diastolic BP (mmHg) 87 87 79 83 92    Pulse 84 90 87 87 90

## 2017-09-15 ENCOUNTER — PATIENT OUTREACH (OUTPATIENT)
Dept: OTHER | Facility: OTHER | Age: 47
End: 2017-09-15

## 2017-09-15 NOTE — PROGRESS NOTES
Last 5 Patient Entered Redings Current 30 Day Average: 134/83     Recent Readings 9/14/2017 9/14/2017 9/13/2017 9/12/2017 9/9/2017    Systolic BP (mmHg) 136 137 86 131 138    Diastolic BP (mmHg) 87 41 70 44 87    Pulse 88 92 88 97 84          Hypertension Digital Medicine Program (HDMP): Health  Introduction    Introduced Mrs. Steph Sterling to HDMP. Discussed health  role and recommended lifestyle modifications.    Diet (i.e. Low sodium, food labels): Patient mentioned she eats out and cooks at home. Mrs. Sterling reports she does not add any salt to her meals. Patient mentioned she wants to start eating healthier by adding more vegetables to her diet. We will be working on adding fresh vegetables to her diet and choosing low sodium options.     Exercise: Patient mentioned she walks in the park or mall two or three times a week.     Alcohol/Tobacco: Patient does not drink or smoke.     Medication Adherence: has been compliant with the medicaiton regimen.    Patient mentioned she wants to focus on losing weight to help lower her BP .     Reviewed AHA recommendations:  Limit sodium intake to <2000mg/day  Perform 150 minutes of physical activity per week    Patient is aware of the importance of taking daily BP readings at various times of the day  Patient aware that the health  can be used as a resource for lifestyle modifications to help reduce or maintain a healthy BP  Patient is aware of the importance of medication adherence.  Patient is aware that HDMP team is not available for emergencies. Patient should call 911 or [facility] on [facility] if one arises.

## 2017-09-29 ENCOUNTER — PATIENT OUTREACH (OUTPATIENT)
Dept: OTHER | Facility: OTHER | Age: 47
End: 2017-09-29

## 2017-09-29 NOTE — PROGRESS NOTES
Last 5 Patient Entered Redings Current 30 Day Average: 131/84     Recent Readings 9/27/2017 9/24/2017 9/23/2017 9/22/2017 9/20/2017    Systolic BP (mmHg) 127 154 146 131 117    Diastolic BP (mmHg) 77 90 87 85 72    Pulse 88 88 99 90 85          Hypertension Digital Medicine (HDMP) Health  Follow Up    LVM to follow up with Mrs. Steph Sterling.    Per 30 day average, blood pressure is well controlled 131/84 mmHg. Encouraged adherence to low sodium diet and physical activity guidelines. Advised patient to call or message with questions or concerns. WCB in 2 weeks.

## 2017-10-23 NOTE — PROGRESS NOTES
"Last 5 Patient Entered Redings Current 30 Day Average: 135/85     Recent Readings 10/22/2017 10/19/2017 10/14/2017 10/13/2017 10/12/2017    Systolic BP (mmHg) 143 101 134 147 141    Diastolic BP (mmHg) 87 64 99 82 78    Pulse 81 86 93 104 91          Hypertension Digital Medicine Program (HDMP): Health  Follow Up    Lifestyle Modifications:    1.Low sodium diet: yes. Patient reports she is still following diet. Patient states she has cut out soft drinks and eating out once a week. Patient reports she has been using [employer] robert to help her with shopping at the stores to follow a low sodium diet.     2.Physical activity: yes. Patient reports he is still walking two to three times a week in the park or mall.     3.Hypotension/Hypertension symptoms: yes. Patient reports she has been having headaches in the morning before she takes her BP medicine. I will put in a task for her PharmD Kelby to see if medication needs to be adjusted.   Frequency/Alleviating factors/Precipitating factors, etc.     4.Patient has been compliant with the medication regimen.     Follow up with Mrs. Steph George Asher completed. No further questions or concerns. I will follow up in a few weeks to assess progress.     Patient states she is doing "well".Pateint reports she will be having surgery on Nov 15 and would like to be put on hiatus.     "

## 2017-10-24 ENCOUNTER — PATIENT OUTREACH (OUTPATIENT)
Dept: OTHER | Facility: OTHER | Age: 47
End: 2017-10-24

## 2017-10-24 NOTE — PROGRESS NOTES
Last 5 Patient Entered Redings Current 30 Day Average: 134/84     Recent Readings 10/22/2017 10/19/2017 10/14/2017 10/13/2017 10/12/2017    Systolic BP (mmHg) 143 101 134 147 141    Diastolic BP (mmHg) 87 64 99 82 78    Pulse 81 86 93 104 91        Called Ms. Sterling to introduce her to the Hypertension Digital Medicine Program.      Ms. Sterling's BP average is at goal. She did have a headache when BP dropped on 10/31, but cannot remember if she had any symptoms on 10/28 when BP was elevated. Asked that she make notes for herself when her BP is elevated if it is related to something she ate, so that way she will see how it affects her BP. I will follow up in 1 month to reassess BP average.     Reviewed patient's medications and verified allergies on file.   Hypertension Medications             verapamil (VERELAN) 240 MG C24P TAKE 1 CAPSULE(240 MG) BY MOUTH EVERY DAY        Explained that we expect her to obtain several blood pressures/week at random times of day. Also asked that the BP be taken at least 1 hour after taking BP medications.     Explained that our goal is to get her BP to consistently below 140/90mmHg.     Patient and I agreed that the patient will take her BP daily to every other day at varying times of the day.     Emailed patient link to [facility] HTN webpage as well as my direct phone number in case she has any questions.

## 2017-10-27 RX ORDER — VERAPAMIL HYDROCHLORIDE 240 MG/1
CAPSULE, EXTENDED RELEASE ORAL
Qty: 90 CAPSULE | Refills: 0 | Status: SHIPPED | OUTPATIENT
Start: 2017-10-27 | End: 2018-01-28 | Stop reason: SDUPTHER

## 2017-11-06 ENCOUNTER — PATIENT OUTREACH (OUTPATIENT)
Dept: OTHER | Facility: OTHER | Age: 47
End: 2017-11-06

## 2017-11-10 ENCOUNTER — OFFICE VISIT (OUTPATIENT)
Dept: OBSTETRICS AND GYNECOLOGY | Facility: CLINIC | Age: 47
End: 2017-11-10
Payer: COMMERCIAL

## 2017-11-10 ENCOUNTER — HOSPITAL ENCOUNTER (OUTPATIENT)
Dept: PREADMISSION TESTING | Facility: HOSPITAL | Age: 47
Discharge: HOME | End: 2017-11-10
Attending: OBSTETRICS & GYNECOLOGY
Payer: COMMERCIAL

## 2017-11-10 VITALS
WEIGHT: 293 LBS | TEMPERATURE: 98 F | DIASTOLIC BLOOD PRESSURE: 86 MMHG | OXYGEN SATURATION: 99 % | BODY MASS INDEX: 47.09 KG/M2 | RESPIRATION RATE: 16 BRPM | SYSTOLIC BLOOD PRESSURE: 130 MMHG | HEIGHT: 66 IN | HEART RATE: 80 BPM

## 2017-11-10 VITALS — WEIGHT: 293 LBS | BODY MASS INDEX: 47.61 KG/M2

## 2017-11-10 DIAGNOSIS — N83.209 CYST OF OVARY, UNSPECIFIED LATERALITY: Primary | ICD-10-CM

## 2017-11-10 DIAGNOSIS — D36.9 DERMOID TUMOR: ICD-10-CM

## 2017-11-10 DIAGNOSIS — Z01.818 PRE-OP TESTING: Primary | ICD-10-CM

## 2017-11-10 DIAGNOSIS — Z01.818 PRE-OP EXAM: ICD-10-CM

## 2017-11-10 DIAGNOSIS — N83.209 CYST OF OVARY, UNSPECIFIED LATERALITY: ICD-10-CM

## 2017-11-10 LAB
ALBUMIN SERPL BCP-MCNC: 3.9 G/DL
ALP SERPL-CCNC: 126 U/L
ALT SERPL W/O P-5'-P-CCNC: 14 U/L
ANION GAP SERPL CALC-SCNC: 12 MMOL/L
AST SERPL-CCNC: 13 U/L
BACTERIA #/AREA URNS HPF: ABNORMAL /HPF
BASOPHILS # BLD AUTO: 0.01 K/UL
BASOPHILS NFR BLD: 0.1 %
BILIRUB SERPL-MCNC: 0.9 MG/DL
BILIRUB UR QL STRIP: NEGATIVE
BUN SERPL-MCNC: 18 MG/DL
CALCIUM SERPL-MCNC: 9.8 MG/DL
CHLORIDE SERPL-SCNC: 104 MMOL/L
CLARITY UR: ABNORMAL
CO2 SERPL-SCNC: 24 MMOL/L
COLOR UR: YELLOW
CREAT SERPL-MCNC: 0.8 MG/DL
DIFFERENTIAL METHOD: ABNORMAL
EOSINOPHIL # BLD AUTO: 0.2 K/UL
EOSINOPHIL NFR BLD: 2.1 %
ERYTHROCYTE [DISTWIDTH] IN BLOOD BY AUTOMATED COUNT: 12.3 %
EST. GFR  (AFRICAN AMERICAN): >60 ML/MIN/1.73 M^2
EST. GFR  (NON AFRICAN AMERICAN): >60 ML/MIN/1.73 M^2
GLUCOSE SERPL-MCNC: 70 MG/DL
GLUCOSE UR QL STRIP: NEGATIVE
HCT VFR BLD AUTO: 43.2 %
HGB BLD-MCNC: 14.5 G/DL
HGB UR QL STRIP: ABNORMAL
KETONES UR QL STRIP: ABNORMAL
LEUKOCYTE ESTERASE UR QL STRIP: NEGATIVE
LYMPHOCYTES # BLD AUTO: 3.3 K/UL
LYMPHOCYTES NFR BLD: 43.5 %
MCH RBC QN AUTO: 30.2 PG
MCHC RBC AUTO-ENTMCNC: 33.6 G/DL
MCV RBC AUTO: 90 FL
MICROSCOPIC COMMENT: ABNORMAL
MONOCYTES # BLD AUTO: 0.4 K/UL
MONOCYTES NFR BLD: 5.2 %
NEUTROPHILS # BLD AUTO: 3.7 K/UL
NEUTROPHILS NFR BLD: 49.1 %
NITRITE UR QL STRIP: NEGATIVE
PH UR STRIP: 5 [PH] (ref 5–8)
PLATELET # BLD AUTO: 368 K/UL
PMV BLD AUTO: 8.8 FL
POTASSIUM SERPL-SCNC: 4 MMOL/L
PROT SERPL-MCNC: 8.4 G/DL
PROT UR QL STRIP: NEGATIVE
RBC # BLD AUTO: 4.8 M/UL
RBC #/AREA URNS HPF: 2 /HPF (ref 0–4)
SODIUM SERPL-SCNC: 140 MMOL/L
SP GR UR STRIP: 1.02 (ref 1–1.03)
SQUAMOUS #/AREA URNS HPF: 5 /HPF
URN SPEC COLLECT METH UR: ABNORMAL
UROBILINOGEN UR STRIP-ACNC: NEGATIVE EU/DL
WBC # BLD AUTO: 7.5 K/UL
WBC #/AREA URNS HPF: 2 /HPF (ref 0–5)

## 2017-11-10 PROCEDURE — 99499 UNLISTED E&M SERVICE: CPT | Mod: S$GLB,,, | Performed by: OBSTETRICS & GYNECOLOGY

## 2017-11-10 PROCEDURE — 80053 COMPREHEN METABOLIC PANEL: CPT

## 2017-11-10 PROCEDURE — 36415 COLL VENOUS BLD VENIPUNCTURE: CPT

## 2017-11-10 PROCEDURE — 93005 ELECTROCARDIOGRAM TRACING: CPT

## 2017-11-10 PROCEDURE — 93010 ELECTROCARDIOGRAM REPORT: CPT | Mod: ,,, | Performed by: INTERNAL MEDICINE

## 2017-11-10 PROCEDURE — 85025 COMPLETE CBC W/AUTO DIFF WBC: CPT

## 2017-11-10 PROCEDURE — 99999 PR PBB SHADOW E&M-EST. PATIENT-LVL II: CPT | Mod: PBBFAC,,, | Performed by: OBSTETRICS & GYNECOLOGY

## 2017-11-10 PROCEDURE — 81000 URINALYSIS NONAUTO W/SCOPE: CPT

## 2017-11-10 RX ORDER — MUPIROCIN 20 MG/G
OINTMENT TOPICAL
Status: CANCELLED | OUTPATIENT
Start: 2017-11-10

## 2017-11-10 RX ORDER — AMOXICILLIN 875 MG/1
875 TABLET, FILM COATED ORAL EVERY 12 HOURS
COMMUNITY
Start: 2017-11-04 | End: 2018-08-01

## 2017-11-10 RX ORDER — AZELASTINE 1 MG/ML
1 SPRAY, METERED NASAL 2 TIMES DAILY PRN
COMMUNITY
Start: 2017-11-04 | End: 2018-08-01

## 2017-11-10 RX ORDER — SODIUM CHLORIDE, SODIUM LACTATE, POTASSIUM CHLORIDE, CALCIUM CHLORIDE 600; 310; 30; 20 MG/100ML; MG/100ML; MG/100ML; MG/100ML
INJECTION, SOLUTION INTRAVENOUS CONTINUOUS
Status: CANCELLED | OUTPATIENT
Start: 2017-11-10

## 2017-11-10 RX ORDER — BENZONATATE 100 MG/1
100 CAPSULE ORAL 3 TIMES DAILY PRN
COMMUNITY
Start: 2017-11-04 | End: 2018-08-01

## 2017-11-10 RX ORDER — PROMETHAZINE HYDROCHLORIDE AND DEXTROMETHORPHAN HYDROBROMIDE 6.25; 15 MG/5ML; MG/5ML
SYRUP ORAL
COMMUNITY
Start: 2017-11-04 | End: 2018-08-30

## 2017-11-10 NOTE — PROGRESS NOTES
"Cc:  Pre-op for LSO with R salpingectomy    HPI:  47 yro  who currently has Mirena IUD presents on referral from Dr. Vines for L dermoid cyst.  Pt desires L oopherectomy and pt states she would also like to get her "tubes tied."  Pt denies any bleeding issues at this time because they have been resolved with Mirena.  Pt had been counseled previously by myself about surgery and pt is her for pre-op.     Pelvic sono:  Results: Transabdominal and transvaginal pelvic ultrasound performed.  The uterus measures 7.7 cm in length and 4.4 x 4.5 cm in transverse dimensions.  The endometrium is normal thickness measuring 3 mm.   No discrete uterine fibroids identified.  The ovaries are normal in size.  The right ovary measures 3.6 x 2.3 x 2.8 cm.  The left ovary measures 2.6 x 2.5 x 2.4 cm. there is a small hyperechoic mass in the left ovary measuring 1.6 x 2.2 x 1.6 cm, unchanged. Doppler flow demonstrated to both ovaries.  No free fluid. Nabothian cysts noted.   Impression      Normal sized left ovary containing a small hyperechoic area, similar to the prior exams, possible dermoid.      Pelvic MRI  The uterus measures 8.4 x 3.0 x 5.0 cm.  No uterine fibroids identified.  The right ovary measures approximately 2.9 x 1.7 x 3.2 cm and contains a small follicle.  The left ovary measures 4.9 x 3.2 x 3.1 cm and contains a a 4.1 x 2.0 x 1.9 cm mostly fatty mass likely a dermoid.  There is also a small hemorrhagic cyst measuring 1.2 cm.  There is no free fluid.  No pelvic adenopathy.  The remainder of the visualized soft tissues appear normal.   Impression       Small fatty mass on to the left ovary, likely an ovarian dermoid     PMHx:  Reviewed  PSHx:  Reviewed  Allergies:  NKDA  Meds:  Reviewed    Review of Systems   Constitutional: Negative.    HENT: Negative.    Respiratory: Negative.    Cardiovascular: Negative.    Gastrointestinal: Negative.    Genitourinary: Negative.    Neurological: Negative.  "   Psychiatric/Behavioral: Negative.      Physical Exam   Constitutional: She is oriented to person, place, and time. She appears well-developed and well-nourished. No distress.   HENT:   Head: Normocephalic and atraumatic.   Neck: Normal range of motion.   Cardiovascular: Normal rate.    Pulmonary/Chest: Effort normal.   Abdominal: Soft.   Genitourinary: Vagina normal.   Musculoskeletal: Normal range of motion. She exhibits no deformity.   Neurological: She is alert and oriented to person, place, and time. No cranial nerve deficit. Coordination normal.   Skin: She is not diaphoretic.   Psychiatric: She has a normal mood and affect. Her behavior is normal. Judgment and thought content normal.     Assessment/Plan  1. Cyst of ovary, unspecified laterality    2. Dermoid tumor      Pt consented for laparoscopic LSO with R salpingectomy.  Pt to continue with Mirena for menstrual benefits.

## 2017-11-10 NOTE — DISCHARGE INSTRUCTIONS
Your surgery is scheduled for__WEDNESDAY  November  15,  2017_______________.    Call [phone] between 2 pm and 5 pm __TUESDAY NOVEMBER_ 14, 2017_________ to find out your arrival time for the day of surgery.      Report to SAME DAY SURGERY UNIT at _______AM   on the 2nd floor of the hospital.  Use the front entrance of the hospital before 6 am.                                      Important instructions:   Do not eat or drink after 12 midnight, including water.  It is okay to brush your teeth.                                                                                                                                                                                       Do not have gum, candy or mints.    TAKE  VERAPAMIL  WITH A SIP OF WATER THE AM OF SURGERY                  Return to the hospital lab on _MONDAY November 13, 2017  @  7:10 AM_____for additional blood test.       Prep instructions:    SHOWER   OTHER_____________      Please shower the night before and the morning of your surgery.       Use Hibiclens soap to your surgery site if instructed by your pre op nurse.   If your surgery is on your abdomen, be sure to wash your naval( BELLY BUTTON).    Be sure to rinse off Hibiclens after it is on your skin for several minutes.  Do not use Hibiclens on your face or genitals.     Do not wear make- up, including mascara.     You may wear deodorant only.     Do not wear powder, body lotion or cologne.     If you are going home on the same day of surgery, you must have arrangements for a ride home.  You will not be able to drive home if you were given anesthesia or sedation.      Stop taking Aspirin, Ibuprofen, Motrin and Aleve at least 3-5 days before your surgery                                                                                                                                                                                      . May take Tylenol / Acetaminophen  If needed     Wear  loose fitting clothes allowing for bandages.     Please leave money and valuables home.        You may bring your cell phone.     Call the doctor if fever or illness should occur before your surgery.    Call [phone] to contact us here at [facility] if needed.

## 2017-11-13 ENCOUNTER — LAB VISIT (OUTPATIENT)
Dept: LAB | Facility: HOSPITAL | Age: 47
End: 2017-11-13
Attending: OBSTETRICS & GYNECOLOGY
Payer: COMMERCIAL

## 2017-11-13 ENCOUNTER — ANESTHESIA EVENT (OUTPATIENT)
Dept: SURGERY | Facility: HOSPITAL | Age: 47
End: 2017-11-13
Payer: COMMERCIAL

## 2017-11-13 DIAGNOSIS — Z01.818 PRE-OP TESTING: ICD-10-CM

## 2017-11-13 LAB
ABO + RH BLD: NORMAL
B-HCG UR QL: NEGATIVE
BLD GP AB SCN CELLS X3 SERPL QL: NORMAL

## 2017-11-13 PROCEDURE — 86901 BLOOD TYPING SEROLOGIC RH(D): CPT

## 2017-11-13 PROCEDURE — 86900 BLOOD TYPING SEROLOGIC ABO: CPT

## 2017-11-13 PROCEDURE — 81025 URINE PREGNANCY TEST: CPT

## 2017-11-13 PROCEDURE — 36415 COLL VENOUS BLD VENIPUNCTURE: CPT

## 2017-11-15 ENCOUNTER — ANESTHESIA (OUTPATIENT)
Dept: SURGERY | Facility: HOSPITAL | Age: 47
End: 2017-11-15
Payer: COMMERCIAL

## 2017-11-15 ENCOUNTER — HOSPITAL ENCOUNTER (OUTPATIENT)
Facility: HOSPITAL | Age: 47
Discharge: HOME | End: 2017-11-15
Attending: OBSTETRICS & GYNECOLOGY | Admitting: OBSTETRICS & GYNECOLOGY
Payer: COMMERCIAL

## 2017-11-15 VITALS
BODY MASS INDEX: 47.09 KG/M2 | RESPIRATION RATE: 20 BRPM | WEIGHT: 293 LBS | OXYGEN SATURATION: 98 % | HEART RATE: 99 BPM | DIASTOLIC BLOOD PRESSURE: 80 MMHG | TEMPERATURE: 98 F | SYSTOLIC BLOOD PRESSURE: 136 MMHG | HEIGHT: 66 IN

## 2017-11-15 DIAGNOSIS — D36.9 DERMOID TUMOR: ICD-10-CM

## 2017-11-15 DIAGNOSIS — N83.209 CYST OF OVARY, UNSPECIFIED LATERALITY: ICD-10-CM

## 2017-11-15 DIAGNOSIS — R10.2 PELVIC PAIN IN FEMALE: Primary | ICD-10-CM

## 2017-11-15 LAB — POCT GLUCOSE: 106 MG/DL (ref 70–110)

## 2017-11-15 PROCEDURE — 36000709 HC OR TIME LEV III EA ADD 15 MIN: Performed by: OBSTETRICS & GYNECOLOGY

## 2017-11-15 PROCEDURE — 36000708 HC OR TIME LEV III 1ST 15 MIN: Performed by: OBSTETRICS & GYNECOLOGY

## 2017-11-15 PROCEDURE — 58661 LAPAROSCOPY REMOVE ADNEXA: CPT | Mod: 50,,, | Performed by: OBSTETRICS & GYNECOLOGY

## 2017-11-15 PROCEDURE — 37000008 HC ANESTHESIA 1ST 15 MINUTES: Performed by: OBSTETRICS & GYNECOLOGY

## 2017-11-15 PROCEDURE — 71000039 HC RECOVERY, EACH ADD'L HOUR: Performed by: OBSTETRICS & GYNECOLOGY

## 2017-11-15 PROCEDURE — D9220A PRA ANESTHESIA: Mod: ANES,,, | Performed by: ANESTHESIOLOGY

## 2017-11-15 PROCEDURE — 37000009 HC ANESTHESIA EA ADD 15 MINS: Performed by: OBSTETRICS & GYNECOLOGY

## 2017-11-15 PROCEDURE — 25000003 PHARM REV CODE 250: Performed by: ANESTHESIOLOGY

## 2017-11-15 PROCEDURE — 58661 LAPAROSCOPY REMOVE ADNEXA: CPT | Mod: 50,80,, | Performed by: OBSTETRICS & GYNECOLOGY

## 2017-11-15 PROCEDURE — 63600175 PHARM REV CODE 636 W HCPCS: Performed by: ANESTHESIOLOGY

## 2017-11-15 PROCEDURE — 63600175 PHARM REV CODE 636 W HCPCS: Performed by: NURSE ANESTHETIST, CERTIFIED REGISTERED

## 2017-11-15 PROCEDURE — 88305 TISSUE EXAM BY PATHOLOGIST: CPT

## 2017-11-15 PROCEDURE — D9220A PRA ANESTHESIA: Mod: CRNA,,, | Performed by: NURSE ANESTHETIST, CERTIFIED REGISTERED

## 2017-11-15 PROCEDURE — 25000003 PHARM REV CODE 250: Performed by: NURSE ANESTHETIST, CERTIFIED REGISTERED

## 2017-11-15 PROCEDURE — 71000016 HC POSTOP RECOV ADDL HR: Performed by: OBSTETRICS & GYNECOLOGY

## 2017-11-15 PROCEDURE — 88305 TISSUE EXAM BY PATHOLOGIST: CPT | Mod: 26,,,

## 2017-11-15 PROCEDURE — 82962 GLUCOSE BLOOD TEST: CPT | Performed by: OBSTETRICS & GYNECOLOGY

## 2017-11-15 PROCEDURE — 63600175 PHARM REV CODE 636 W HCPCS: Performed by: OBSTETRICS & GYNECOLOGY

## 2017-11-15 PROCEDURE — 27201423 OPTIME MED/SURG SUP & DEVICES STERILE SUPPLY: Performed by: OBSTETRICS & GYNECOLOGY

## 2017-11-15 PROCEDURE — 25000003 PHARM REV CODE 250: Performed by: OBSTETRICS & GYNECOLOGY

## 2017-11-15 PROCEDURE — 71000033 HC RECOVERY, INTIAL HOUR: Performed by: OBSTETRICS & GYNECOLOGY

## 2017-11-15 PROCEDURE — 71000015 HC POSTOP RECOV 1ST HR: Performed by: OBSTETRICS & GYNECOLOGY

## 2017-11-15 RX ORDER — DIPHENHYDRAMINE HCL 25 MG
25 CAPSULE ORAL EVERY 4 HOURS PRN
Status: DISCONTINUED | OUTPATIENT
Start: 2017-11-15 | End: 2017-11-15 | Stop reason: HOSPADM

## 2017-11-15 RX ORDER — HYDROCODONE BITARTRATE AND ACETAMINOPHEN 5; 325 MG/1; MG/1
1 TABLET ORAL EVERY 4 HOURS PRN
Status: DISCONTINUED | OUTPATIENT
Start: 2017-11-15 | End: 2017-11-15 | Stop reason: HOSPADM

## 2017-11-15 RX ORDER — SODIUM CHLORIDE, SODIUM LACTATE, POTASSIUM CHLORIDE, CALCIUM CHLORIDE 600; 310; 30; 20 MG/100ML; MG/100ML; MG/100ML; MG/100ML
INJECTION, SOLUTION INTRAVENOUS CONTINUOUS
Status: DISCONTINUED | OUTPATIENT
Start: 2017-11-15 | End: 2017-11-15 | Stop reason: HOSPADM

## 2017-11-15 RX ORDER — MIDAZOLAM HYDROCHLORIDE 1 MG/ML
INJECTION, SOLUTION INTRAMUSCULAR; INTRAVENOUS
Status: DISCONTINUED | OUTPATIENT
Start: 2017-11-15 | End: 2017-11-15

## 2017-11-15 RX ORDER — HYDROCODONE BITARTRATE AND ACETAMINOPHEN 10; 325 MG/1; MG/1
1 TABLET ORAL EVERY 4 HOURS PRN
Status: DISCONTINUED | OUTPATIENT
Start: 2017-11-15 | End: 2017-11-15 | Stop reason: HOSPADM

## 2017-11-15 RX ORDER — SODIUM CHLORIDE 0.9 % (FLUSH) 0.9 %
3 SYRINGE (ML) INJECTION
Status: DISCONTINUED | OUTPATIENT
Start: 2017-11-15 | End: 2017-11-15 | Stop reason: HOSPADM

## 2017-11-15 RX ORDER — IBUPROFEN 800 MG/1
800 TABLET ORAL EVERY 8 HOURS PRN
Qty: 30 TABLET | Refills: 1 | Status: SHIPPED | OUTPATIENT
Start: 2017-11-15 | End: 2018-08-01

## 2017-11-15 RX ORDER — CEFAZOLIN SODIUM 2 G/50ML
2 SOLUTION INTRAVENOUS
Status: DISCONTINUED | OUTPATIENT
Start: 2017-11-15 | End: 2017-11-15 | Stop reason: HOSPADM

## 2017-11-15 RX ORDER — ROCURONIUM BROMIDE 10 MG/ML
INJECTION, SOLUTION INTRAVENOUS
Status: DISCONTINUED | OUTPATIENT
Start: 2017-11-15 | End: 2017-11-15

## 2017-11-15 RX ORDER — ONDANSETRON 2 MG/ML
INJECTION INTRAMUSCULAR; INTRAVENOUS
Status: DISCONTINUED | OUTPATIENT
Start: 2017-11-15 | End: 2017-11-15

## 2017-11-15 RX ORDER — SODIUM CHLORIDE 9 MG/ML
INJECTION, SOLUTION INTRAVENOUS
Status: DISCONTINUED | OUTPATIENT
Start: 2017-11-15 | End: 2017-11-15 | Stop reason: HOSPADM

## 2017-11-15 RX ORDER — ONDANSETRON 8 MG/1
8 TABLET, ORALLY DISINTEGRATING ORAL EVERY 8 HOURS PRN
Status: DISCONTINUED | OUTPATIENT
Start: 2017-11-15 | End: 2017-11-15 | Stop reason: HOSPADM

## 2017-11-15 RX ORDER — SODIUM CHLORIDE 0.9 G/100ML
IRRIGANT IRRIGATION
Status: DISCONTINUED | OUTPATIENT
Start: 2017-11-15 | End: 2017-11-15 | Stop reason: HOSPADM

## 2017-11-15 RX ORDER — LIDOCAINE HCL/PF 100 MG/5ML
SYRINGE (ML) INTRAVENOUS
Status: DISCONTINUED | OUTPATIENT
Start: 2017-11-15 | End: 2017-11-15

## 2017-11-15 RX ORDER — HYDROMORPHONE HYDROCHLORIDE 2 MG/ML
0.2 INJECTION, SOLUTION INTRAMUSCULAR; INTRAVENOUS; SUBCUTANEOUS EVERY 5 MIN PRN
Status: DISCONTINUED | OUTPATIENT
Start: 2017-11-15 | End: 2017-11-15 | Stop reason: HOSPADM

## 2017-11-15 RX ORDER — ACETAMINOPHEN 10 MG/ML
1000 INJECTION, SOLUTION INTRAVENOUS ONCE
Status: COMPLETED | OUTPATIENT
Start: 2017-11-15 | End: 2017-11-15

## 2017-11-15 RX ORDER — IBUPROFEN 400 MG/1
800 TABLET ORAL 3 TIMES DAILY
Status: DISCONTINUED | OUTPATIENT
Start: 2017-11-15 | End: 2017-11-15 | Stop reason: HOSPADM

## 2017-11-15 RX ORDER — LIDOCAINE HYDROCHLORIDE 10 MG/ML
1 INJECTION, SOLUTION EPIDURAL; INFILTRATION; INTRACAUDAL; PERINEURAL ONCE
Status: DISCONTINUED | OUTPATIENT
Start: 2017-11-15 | End: 2017-11-15 | Stop reason: HOSPADM

## 2017-11-15 RX ORDER — FENTANYL CITRATE 50 UG/ML
INJECTION, SOLUTION INTRAMUSCULAR; INTRAVENOUS
Status: DISCONTINUED | OUTPATIENT
Start: 2017-11-15 | End: 2017-11-15

## 2017-11-15 RX ORDER — PROPOFOL 10 MG/ML
VIAL (ML) INTRAVENOUS
Status: DISCONTINUED | OUTPATIENT
Start: 2017-11-15 | End: 2017-11-15

## 2017-11-15 RX ORDER — ONDANSETRON 2 MG/ML
4 INJECTION INTRAMUSCULAR; INTRAVENOUS ONCE AS NEEDED
Status: DISCONTINUED | OUTPATIENT
Start: 2017-11-15 | End: 2017-11-15 | Stop reason: HOSPADM

## 2017-11-15 RX ORDER — KETOROLAC TROMETHAMINE 30 MG/ML
30 INJECTION, SOLUTION INTRAMUSCULAR; INTRAVENOUS ONCE
Status: COMPLETED | OUTPATIENT
Start: 2017-11-15 | End: 2017-11-15

## 2017-11-15 RX ORDER — METOCLOPRAMIDE HYDROCHLORIDE 5 MG/ML
INJECTION INTRAMUSCULAR; INTRAVENOUS
Status: DISCONTINUED | OUTPATIENT
Start: 2017-11-15 | End: 2017-11-15

## 2017-11-15 RX ORDER — DIPHENHYDRAMINE HYDROCHLORIDE 50 MG/ML
25 INJECTION INTRAMUSCULAR; INTRAVENOUS EVERY 4 HOURS PRN
Status: DISCONTINUED | OUTPATIENT
Start: 2017-11-15 | End: 2017-11-15 | Stop reason: HOSPADM

## 2017-11-15 RX ORDER — NEOSTIGMINE METHYLSULFATE 1 MG/ML
INJECTION, SOLUTION INTRAVENOUS
Status: DISCONTINUED | OUTPATIENT
Start: 2017-11-15 | End: 2017-11-15

## 2017-11-15 RX ORDER — PHENYLEPHRINE HYDROCHLORIDE 10 MG/ML
INJECTION INTRAVENOUS
Status: DISCONTINUED | OUTPATIENT
Start: 2017-11-15 | End: 2017-11-15

## 2017-11-15 RX ORDER — GLYCOPYRROLATE 0.2 MG/ML
INJECTION INTRAMUSCULAR; INTRAVENOUS
Status: DISCONTINUED | OUTPATIENT
Start: 2017-11-15 | End: 2017-11-15

## 2017-11-15 RX ORDER — HYDROCODONE BITARTRATE AND ACETAMINOPHEN 5; 325 MG/1; MG/1
1 TABLET ORAL EVERY 4 HOURS PRN
Qty: 14 TABLET | Refills: 0 | Status: SHIPPED | OUTPATIENT
Start: 2017-11-15 | End: 2018-08-01

## 2017-11-15 RX ORDER — MUPIROCIN 20 MG/G
OINTMENT TOPICAL
Status: DISCONTINUED | OUTPATIENT
Start: 2017-11-15 | End: 2017-11-15 | Stop reason: HOSPADM

## 2017-11-15 RX ADMIN — PHENYLEPHRINE HYDROCHLORIDE 200 MCG: 10 INJECTION INTRAVENOUS at 11:11

## 2017-11-15 RX ADMIN — FENTANYL CITRATE 50 MCG: 50 INJECTION INTRAMUSCULAR; INTRAVENOUS at 11:11

## 2017-11-15 RX ADMIN — METOCLOPRAMIDE 10 MG: 5 INJECTION, SOLUTION INTRAMUSCULAR; INTRAVENOUS at 10:11

## 2017-11-15 RX ADMIN — NEOSTIGMINE METHYLSULFATE 2.5 MG: 1 INJECTION INTRAVENOUS at 12:11

## 2017-11-15 RX ADMIN — SODIUM CHLORIDE, SODIUM LACTATE, POTASSIUM CHLORIDE, AND CALCIUM CHLORIDE: .6; .31; .03; .02 INJECTION, SOLUTION INTRAVENOUS at 08:11

## 2017-11-15 RX ADMIN — KETOROLAC TROMETHAMINE 30 MG: 30 INJECTION, SOLUTION INTRAMUSCULAR at 12:11

## 2017-11-15 RX ADMIN — ONDANSETRON 4 MG: 2 INJECTION, SOLUTION INTRAMUSCULAR; INTRAVENOUS at 10:11

## 2017-11-15 RX ADMIN — CEFAZOLIN SODIUM 2 G: 2 SOLUTION INTRAVENOUS at 10:11

## 2017-11-15 RX ADMIN — GLYCOPYRROLATE 0.2 MG: 0.2 INJECTION, SOLUTION INTRAMUSCULAR; INTRAVENOUS at 11:11

## 2017-11-15 RX ADMIN — MIDAZOLAM HYDROCHLORIDE 2 MG: 1 INJECTION, SOLUTION INTRAMUSCULAR; INTRAVENOUS at 10:11

## 2017-11-15 RX ADMIN — GLYCOPYRROLATE 0.2 MG: 0.2 INJECTION, SOLUTION INTRAMUSCULAR; INTRAVENOUS at 10:11

## 2017-11-15 RX ADMIN — NEOSTIGMINE METHYLSULFATE 2.5 MG: 1 INJECTION INTRAVENOUS at 11:11

## 2017-11-15 RX ADMIN — LIDOCAINE HYDROCHLORIDE 100 MG: 20 INJECTION, SOLUTION INTRAVENOUS at 10:11

## 2017-11-15 RX ADMIN — PROPOFOL 200 MG: 10 INJECTION, EMULSION INTRAVENOUS at 10:11

## 2017-11-15 RX ADMIN — SODIUM CHLORIDE, SODIUM LACTATE, POTASSIUM CHLORIDE, AND CALCIUM CHLORIDE: .6; .31; .03; .02 INJECTION, SOLUTION INTRAVENOUS at 10:11

## 2017-11-15 RX ADMIN — FENTANYL CITRATE 100 MCG: 50 INJECTION INTRAMUSCULAR; INTRAVENOUS at 10:11

## 2017-11-15 RX ADMIN — ROCURONIUM BROMIDE 40 MG: 10 INJECTION, SOLUTION INTRAVENOUS at 10:11

## 2017-11-15 RX ADMIN — ACETAMINOPHEN 1000 MG: 10 INJECTION, SOLUTION INTRAVENOUS at 12:11

## 2017-11-15 NOTE — ANESTHESIA POSTPROCEDURE EVALUATION
"Anesthesia Post Evaluation    Patient: Steph Sterling    Procedure(s) Performed: Procedure(s) (LRB):  OOPHORECTOMY-LAPAROSCOPIC with bilateral salpingectomy (Left)    Final Anesthesia Type: general  Patient location during evaluation: PACU  Patient participation: Yes- Able to Participate  Level of consciousness: awake and alert and oriented  Post-procedure vital signs: reviewed and stable  Pain management: adequate  Airway patency: patent  PONV status at discharge: No PONV  Anesthetic complications: no      Cardiovascular status: blood pressure returned to baseline and hemodynamically stable  Respiratory status: unassisted, spontaneous ventilation and room air  Hydration status: euvolemic  Follow-up not needed.        Visit Vitals  BP (!) 143/79   Pulse 74   Temp 37 °C (98.6 °F) (Oral)   Resp 14   Ht 5' 6" (1.676 m)   Wt 133.4 kg (294 lb)   SpO2 98%   BMI 47.45 kg/m²       Pain/Yolande Score: Pain Assessment Performed: Yes (11/15/2017 12:16 PM)  Presence of Pain: denies (11/15/2017 12:16 PM)  Pain Rating Prior to Med Admin: 4 (11/15/2017 12:49 PM)  Yolande Score: 10 (11/15/2017 12:46 PM)      "

## 2017-11-15 NOTE — TRANSFER OF CARE
"Anesthesia Transfer of Care Note    Patient: Steph Sterling    Procedure(s) Performed: Procedure(s) (LRB):  OOPHORECTOMY-LAPAROSCOPIC with bilateral salpingectomy (Left)    Patient location: PACU    Anesthesia Type: general    Transport from OR: Transported from OR on room air with adequate spontaneous ventilation    Post pain: adequate analgesia    Post assessment: no apparent anesthetic complications and tolerated procedure well    Post vital signs: stable    Level of consciousness: awake, alert and oriented    Nausea/Vomiting: no nausea/vomiting    Complications: none    Transfer of care protocol was followed      Last vitals:   Visit Vitals  BP (!) 140/86   Pulse 68   Temp 37 °C (98.6 °F) (Oral)   Resp 14   Ht 5' 6" (1.676 m)   Wt 133.4 kg (294 lb)   SpO2 99%   BMI 47.45 kg/m²     "

## 2017-11-15 NOTE — INTERVAL H&P NOTE
The patient has been examined and the H&P has been reviewed:    I concur with the findings and no changes have occurred since H&P was written.    Anesthesia/Surgery risks, benefits and alternative options discussed and understood by patient/family.          Active Hospital Problems    Diagnosis  POA    Cyst of ovary [N83.209]  Yes      Resolved Hospital Problems    Diagnosis Date Resolved POA   No resolved problems to display.

## 2017-11-15 NOTE — DISCHARGE INSTRUCTIONS
BATHING:                   You may shower after your dressing is removed, but no tub baths, hot tubs, saunas or swimming until you see the doctor.    DRESSING:  ? If there are skin tapes over the incision, leave them in place. They will start to come off in 5-7 days.  ACTIVITY LEVEL: If you have received sedation or an anesthetic, you may feel sleepy for   several hours. Rest until you are more awake. Gradually resume your normal activities  ? No heavy lifting or straining, nothing over 10 lbs., like a gallon of milk.  ? Pelvic rest- no sex, tampons or douching until follow up or instructed by doctor.  DIET:  You may resume your home diet. If nausea is present, increase your diet gradually with fluids and bland foods.    Medications:  Pain medication should be taken only if needed and as directed. If antibiotics are prescribed, the medication should be taken until completed. You will be given an updated list of you medications.  ? No driving, alcoholic beverages or signing legal documents for next 24 hours or while taking pain medication    CALL THE DOCTOR:    For any obvious bleeding (some dried blood over the incision is normal).      Redness, swelling, foul smell around incision or fever over 101.   Shortness of breath, Coughing up Bloody Sputum or Pains or Swelling in your calves.   Persistent pain or nausea not relieved by medication.   If vaginal bleeding is in excess of a normal period.   Problems urinating    If any unusual problems or difficulties occur contact your doctor. If you cannot contact your doctor but feel your signs and symptoms warrant a physicians attention return to the emergency room.    Fall Prevention  Millions of people fall every year and injure themselves. You may have had anesthesia or sedation which may increase your risk of falling. You may have health issues that put you at an increased risk of falling.     Here are ways to reduce your risk of falling.  ·   · Make your home  safe by keeping walkways clear of objects you may trip over.  · Use non-slip pads under rugs. Do not use area rugs or small throw rugs.  · Use non-slip mats in bathtubs and showers.  · Install handrails and lights on staircases.  · Do not walk in poorly lit areas.  · Do not stand on chairs or wobbly ladders.  · Use caution when reaching overhead or looking upward. This position can cause a loss of balance.  · Be sure your shoes fit properly, have non-slip bottoms and are in good condition.   · Wear shoes both inside and out. Avoid going barefoot or wearing slippers.  · Be cautious when going up and down stairs, curbs, and when walking on uneven sidewalks.  · If your balance is poor, consider using a cane or walker.  · If your fall was related to alcohol use, stop or limit alcohol intake.   · If your fall was related to use of sleeping medicines, talk to your doctor about this. You may need to reduce your dosage at bedtime if you awaken during the night to go to the bathroom.    · To reduce the need for nighttime bathroom trips:  ¨ Avoid drinking fluids for several hours before going to bed  ¨ Empty your bladder before going to bed  ¨ Men can keep a urinal at the bedside  · Stay as active as you can. Balance, flexibility, strength, and endurance all come from exercise. They all play a role in preventing falls. Ask your healthcare provider which types of activity are right for you.  · Get your vision checked on a regular basis.  · If you have pets, know where they are before you stand up or walk so you don't trip over them.  · Use night lights.

## 2017-11-15 NOTE — ANESTHESIA PREPROCEDURE EVALUATION
11/15/2017  Steph Sterling is a 47 y.o., female   Pre-operative evaluation for Procedure(s) (LRB):  OOPHORECTOMY-LAPAROSCOPIC with bilateral salpingectomy (Left)    Steph Sterling is a 47 y.o. female     Patient Active Problem List   Diagnosis    HTN (hypertension)    Migraine with acute onset aura    Dermoid tumor    Cervical radiculopathy    Ovarian cyst    Joint stiffness    Muscle weakness    Difficulty walking    Ovarian tumor    Dermoid    Achilles tendinitis of left lower extremity    Nuclear sclerosis of both eyes    Encounter for contraceptive management    Pelvic pain in female    Urinary tract infection, site not specified    Cyst of ovary       Review of patient's allergies indicates:  No Known Allergies    No current facility-administered medications on file prior to encounter.      Current Outpatient Prescriptions on File Prior to Encounter   Medication Sig Dispense Refill    meloxicam (MOBIC) 15 MG tablet Take 1 tablet (15 mg total) by mouth daily as needed for Pain. 30 tablet 3    methocarbamol (ROBAXIN) 750 MG Tab Take 1 tablet (750 mg total) by mouth 3 (three) times daily. (Patient taking differently: Take 750 mg by mouth 3 (three) times daily as needed. ) 60 tablet 0       Past Surgical History:   Procedure Laterality Date    ANKLE SURGERY Right 11-4-15    achilles tendon repair    VAGINAL DELIVERY         Social History     Social History    Marital status:      Spouse name: N/A    Number of children: N/A    Years of education: N/A     Occupational History    Not on file.     Social History Main Topics    Smoking status: Never Smoker    Smokeless tobacco: Never Used    Alcohol use 0.0 oz/week      Comment: occasional    Drug use: No    Sexual activity: Yes     Partners: Male      Comment: mirena     Other Topics Concern    Not on file     Social  History Narrative    No narrative on file         Vital Signs Range (Last 24H):  Temp:  [36.8 °C (98.2 °F)]   Pulse:  [86]   Resp:  [18]   BP: (140)/(90)   SpO2:  [97 %]     Body mass index is 47.45 kg/m².    Lab Results   Component Value Date    WBC 7.50 11/10/2017    HGB 14.5 11/10/2017    HCT 43.2 11/10/2017    MCV 90 11/10/2017     (H) 11/10/2017         Pre-op Assessment    I have reviewed the Patient Summary Reports.      I have reviewed the Medications.     Review of Systems  Anesthesia Hx:  No problems with previous Anesthesia Denies Hx of Anesthetic complications  History of prior surgery of interest to airway management or planning: Denies Family Hx of Anesthesia complications.   Denies Personal Hx of Anesthesia complications.   Social:  Non-Smoker    Hematology/Oncology:  Hematology Normal   Oncology Normal     EENT/Dental:EENT/Dental Normal   Cardiovascular:  Cardiovascular Normal Exercise tolerance: good Hypertension  Denies CAD.     Denies Angina.  Functional Capacity Can you climb two flights of stairs? ==> Yes    Pulmonary:  Pulmonary Normal  Denies Asthma.  Denies Recent URI.  Denies Sleep Apnea.    Renal/:  Renal/ Normal     Hepatic/GI:  Hepatic/GI Normal Denies PUD.  Denies Hiatal Hernia.  Denies GERD. Denies Liver Disease.  Denies Hepatitis.    Musculoskeletal:  Spine Disorders: cervical    OB/GYN/PEDS:  Dermoid tumor   Neurological:   Denies CVA. Headaches Denies Seizures. Cervical radiculopathy   Endocrine:  Endocrine Normal Denies Diabetes. Denies Hypothyroidism.    Psych:  Psychiatric Normal           Physical Exam  General:  Well nourished, Morbid Obesity    Airway/Jaw/Neck:  Airway Findings: Mouth Opening: Normal Tongue: Normal  General Airway Assessment: Adult  Mallampati: II  TM Distance: Normal, at least 6 cm  Jaw/Neck Findings:  Neck ROM: Normal ROM  Neck Findings:     Eyes/Ears/Nose:  EYES/EARS/NOSE FINDINGS: Normal   Dental:  Dental Findings: In tact    Chest/Lungs:  Chest/Lungs Findings: Clear to auscultation     Heart/Vascular:  Heart Findings: Rate: Normal  Rhythm: Regular Rhythm        Mental Status:  Mental Status Findings:  Alert and Oriented         Anesthesia Plan  Type of Anesthesia, risks & benefits discussed:  Anesthesia Type:  general  Patient's Preference: Proceed with anesthesia understanding that the risks are very small but could be serious or life threatening.  Intra-op Monitoring Plan: standard ASA monitors  Intra-op Monitoring Plan Comments:   Post Op Pain Control Plan: multimodal analgesia and IV/PO Opioids PRN  Post Op Pain Control Plan Comments:   Induction:   IV  Beta Blocker:  Patient is not currently on a Beta-Blocker (No further documentation required).       Informed Consent: Patient understands risks and agrees with Anesthesia plan.  Questions answered. Anesthesia consent signed with patient.  ASA Score: 3     Day of Surgery Review of History & Physical: I have interviewed and examined the patient. I have reviewed the patient's H&P dated:    H&P update referred to the surgeon.         Ready For Surgery From Anesthesia Perspective.

## 2017-11-16 NOTE — OP NOTE
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    PROCEDURE:  Laparoscopic left salpingo-oophorectomy with right salpingectomy.    SURGEON:  Monroe Marrero M.D.    ASSISTANT:  Patrick Conley M.D.    ANESTHESIA:  General endotracheal.    PATHOLOGY:  Left fallopian tube and ovary as well as the right fallopian tube.    ESTIMATED BLOOD LOSS:  Less than 10 mL.    PROCEDURE IN DETAIL:  After informed consent was obtained, the patient was taken   to the Operating Room and administered general endotracheal anesthesia without   difficulty.  The patient was prepped and draped in normal sterile fashion in   dorsal lithotomy position.  An infraumbilical skin incision was made with a   scalpel approximately 5 mm in diameter.  Veress needle inserted through this   incision and the abdomen was insufflated first with low flow then with high flow   CO2 gas once there was no evidence of obstruction.  Upon adequate insufflation,   the Veress needle was removed and a 5-mm optical trocar placed using the   laparoscope for assistance.  Upon adequate entry, the trocar blade was removed   and the laparoscope reinserted confirming intraabdominal placement without   evidence of intraabdominal trauma.  A 10-mm incision was made near the left   anterior superior iliac crest and a 10-mm trocar placed under direct   visualization.  On the right side, a 5-mm incision was made near the right   anterior superior iliac crest and a 5-mm trocar placed under direct   visualization.  Upon survey of the pelvis, there was noted to be significant   endometriosis along the bladder and cul-de-sac.  The left fallopian tube   appeared normal, but adherent to the left ovary, which was multicystic and   lobulated and mildly adhered to the left pelvic sidewall.  The patient's right   tube and ovary appeared  normal.  The left tube and ovary were grasped with an   ovarian grasper and pulled away from the left pelvic sidewall and the   infundibulopelvic ligament on the left side was clamped, cauterized and   transected with the EnSeal device until the left tube and ovary were completely   dissected off of the left pelvic sidewall.  The EnSeal was then used to amputate   the fallopian tube from the uterus as well as the utero-ovarian ligament.    Attention was then turned to the right side in which the right tube was   dissected off the uterus and mesosalpinx with the EnSeal.  An EndoCatch was then   placed through the 10 mm trocar and left tube and ovary and right fallopian   tube were placed in the EndoCatch.  The EndoCatch was then removed through the   10 mm incision, which required mild extension.  Upon survey of the abdomen and   pelvis, excellent hemostasis was noted after copious irrigation with warm normal   saline.  The fascia of the left pelvic sidewall was closed with 2-0 Vicryl   using a SutureEase and the skin incision was closed with 3 simple interrupted   4-0 Rapide Vicryl sutures.  The 5 mm trocars were then removed after the abdomen   was deflated and each incision was closed with a single simple interrupted 4-0   Rapide Vicryl suture.  Sponge, lab, needle, and instrument counts were correct   x2.  There were no noted intraoperative complications.  The patient tolerated   the procedure well and was taken to Recovery awake and in stable condition.      JAYESH/IN  dd: 11/16/2017 17:17:18 (CST)  td: 11/17/2017 00:34:28 (CST)  Doc ID   #[id number]  Job ID #[id number]    CC:       [id number]

## 2017-11-16 NOTE — DISCHARGE SUMMARY
[facility]  Brief Operative Note     SUMMARY     Surgery Date: 11/15/2017     Surgeon(s) and Role:     * Monroe Marrero MD - Primary     * Patrick Conley MD - Assisting        Pre-op Diagnosis:  Sterilization [Z30.2]  Dermoid tumor [D36.9]    Post-op Diagnosis:  Post-Op Diagnosis Codes:     * Sterilization [Z30.2]     * Dermoid tumor [D36.9]    Procedure(s) (LRB):  OOPHORECTOMY-LAPAROSCOPIC with bilateral salpingectomy (Left)    Anesthesia: General    Description of the findings of the procedure: uncomplicated Lap LSO with R salpingectomy    Findings/Key Components: mildly adherent enlarged, cystic L ovary with noted endometrioma and dermoid cyst; pelvic endometriosis noted    Estimated Blood Loss: * No values recorded between 11/15/2017 11:16 AM and 11/15/2017 12:15 PM *         Specimens:   L ovary and bilateral fallopian tubes    Discharge Note    SUMMARY     Admit Date: 11/15/2017    Discharge Date and Time: 11/15/2017  4:05 PM    Hospital Course (synopsis of major diagnoses, care, treatment, and services provided during the course of the hospital stay): Pt admitted for scheduled surgery which was performed without complication.  Postoperatively, the pt did well.  She was ambulating, voiding spontaneously, tolerating oral pain medications and liquids.        Final Diagnosis: Post-Op Diagnosis Codes:     * Sterilization [Z30.2]     * Dermoid tumor [D36.9]    Disposition: Home or Self Care    Follow Up/Patient Instructions:     Medications:  Reconciled Home Medications:   Discharge Medication List as of 11/15/2017  3:52 PM      START taking these medications    Details   hydrocodone-acetaminophen 5-325mg (NORCO) 5-325 mg per tablet Take 1 tablet by mouth every 4 (four) hours as needed for Pain., Starting Wed 11/15/2017, Normal      ibuprofen (ADVIL,MOTRIN) 800 MG tablet Take 1 tablet (800 mg total) by mouth every 8 (eight) hours as needed for Pain., Starting Wed 11/15/2017, Normal          CONTINUE these medications which have NOT CHANGED    Details   azelastine (ASTELIN) 137 mcg (0.1 %) nasal spray 1 spray by Nasal route 2 (two) times daily as needed. , Starting Sat 11/4/2017, Historical Med      benzonatate (TESSALON) 100 MG capsule Take 100 mg by mouth 3 (three) times daily as needed. , Starting Sat 11/4/2017, Historical Med      promethazine-dextromethorphan (PROMETHAZINE-DM) 6.25-15 mg/5 mL Syrp Starting Sat 11/4/2017, Historical Med      verapamil (VERELAN) 240 MG C24P TAKE 1 CAPSULE(240 MG) BY MOUTH EVERY DAY, Normal      amoxicillin (AMOXIL) 875 MG tablet Take 875 mg by mouth every 12 (twelve) hours. , Starting Sat 11/4/2017, Historical Med      methocarbamol (ROBAXIN) 750 MG Tab Take 1 tablet (750 mg total) by mouth 3 (three) times daily., Starting 2/8/2017, Until Discontinued, Normal         STOP taking these medications       meloxicam (MOBIC) 15 MG tablet Comments:   Reason for Stopping:               Discharge Procedure Orders  Diet general     Lifting restrictions     Weight bearing restrictions (specify)     Call MD for:  temperature >100.4     Call MD for:  persistent nausea and vomiting     Call MD for:  severe uncontrolled pain     Call MD for:  difficulty breathing, headache or visual disturbances     Call MD for:  redness, tenderness, or signs of infection (pain, swelling, redness, odor or green/yellow discharge around incision site)     Call MD for:  persistent dizziness or light-headedness     Call MD for:  extreme fatigue     No dressing needed       Follow-up Information     Monroe Marrero MD In 3 weeks.    Specialties:  Obstetrics, Obstetrics and Gynecology  Why:  For wound re-check  Contact information:  [address] [place] [id number] [phone]

## 2017-12-04 ENCOUNTER — PATIENT OUTREACH (OUTPATIENT)
Dept: OTHER | Facility: OTHER | Age: 47
End: 2017-12-04

## 2017-12-04 NOTE — PROGRESS NOTES
Last 5 Patient Entered Readings Current 30 Day Average: 130/85       Units 12/13/2017 12/7/2017 12/4/2017 11/29/2017 11/29/2017    Time - 10:36 PM 11:11 PM 10:36 PM  9:00 PM  8:15 PM    Systolic Blood Pressure - 137 129 130 145 74    Diastolic Blood Pressure - 81 91 87 94 44    Pulse bpm 91 84 91 106 113        Patient's BP average is controlled based on 2017 ACC/AHA HTN guidelines of goal BP <130/80.      Ms. Sterling's BP has been higher lately. She also has not been checking her BP as often. Asked that she check readings more frequently. Her average has been lower in the past (120s/70s). Will not make any medication changes today.     Patient denies s/s of hypotension (lightheadedness, dizziness, nausea, fatigue) associated with low readings. Instructed patient to inform me if this occurs, patient confirms understanding.      Patient's health , Jeanine Dai, will be following up every 3-4 weeks. I will continue to monitor regularly and will follow up in 1-2 months, sooner if BP begins to trend upward or downward.    Patient has my contact information and knows to call with any concerns or clinical changes.     Current HTN regimen:  Hypertension Medications             verapamil (VERELAN) 240 MG C24P TAKE 1 CAPSULE(240 MG) BY MOUTH EVERY DAY

## 2017-12-08 ENCOUNTER — OFFICE VISIT (OUTPATIENT)
Dept: OBSTETRICS AND GYNECOLOGY | Facility: CLINIC | Age: 47
End: 2017-12-08
Payer: COMMERCIAL

## 2017-12-08 VITALS — WEIGHT: 293 LBS | HEIGHT: 66 IN | BODY MASS INDEX: 47.09 KG/M2

## 2017-12-08 DIAGNOSIS — Z09 POSTOPERATIVE EXAMINATION: Primary | ICD-10-CM

## 2017-12-08 PROCEDURE — 99999 PR PBB SHADOW E&M-EST. PATIENT-LVL III: CPT | Mod: PBBFAC,,, | Performed by: OBSTETRICS & GYNECOLOGY

## 2017-12-08 PROCEDURE — 99024 POSTOP FOLLOW-UP VISIT: CPT | Mod: S$GLB,,, | Performed by: OBSTETRICS & GYNECOLOGY

## 2017-12-08 NOTE — PROGRESS NOTES
Cc:  Postop exam    HPI:  Pt had laparoscopic LSO with R salpingectomy on 11/17/17.  No operative complication noted.  Pt now having some pelvic and back pain.  Pt has not been taking any narcotics and intermittently taking motrin 800 mg.  Pt believes Motrin makes her drowsy.  Pt has returned to work, no significant physical activity.    Path:  Fallopian tube and ovary, left-benign dermoid cyst (benign cystic teratoma) of ovary.  Fallopian tube with fimbriated end, right-benign peritubal cyst    Exam:  Abd:  Obese, soft NTND, trocar sites all well healed, mild back pain noted on L lower midline, not flank or CVA tenderness; no pain on right  Pelvic exam:  Pt states pain with uterine mobilization and discomfort with bimanual on L, but none on R    Assessment  Postop exam and pain    Plan  Discussed taking 1/2 Motrin 800 qam and q afternoon with whole pill q hs on a schedule; if no relief; pt to call in 1 week and CT with contrast of abd/pelvis will be considered

## 2018-01-02 NOTE — PROGRESS NOTES
Last 5 Patient Entered Readings Current 30 Day Average: 134/86     Recent Readings 12/29/2017 12/23/2017 12/21/2017 12/20/2017 12/13/2017    SBP (mmHg) 106 144 145 150 137    DBP (mmHg) 76 93 93 83 81    Pulse 93 94 88 95 91

## 2018-01-23 NOTE — PROGRESS NOTES
"Last 5 Patient Entered Readings                                      Current 30 Day Average: 130/87     Recent Readings 1/16/2018 1/15/2018 1/9/2018 1/7/2018 1/6/2018    SBP (mmHg) 125 137 133 146 148    DBP (mmHg) 86 92 90 89 90    Pulse 103 84 95 93 95          Hypertension Digital Medicine Program (HDMP): Health  Follow Up    Lifestyle Modifications:    1.Low sodium diet: yes. Patient reports she has been watching her sodium intake and she has been using no salt brendon's when preparing her food. Patient states she has started buying fresh or frozen vegetables.     2.Physical activity: yes. Patient still walking 2-3 times in the park or mall. Patient states she has been using the stairs at work.     3.Hypotension/Hypertension symptoms: no. Patient reports she has no s/s of hypo/hypertension.   Frequency/Alleviating factors/Precipitating factors, etc.     4.Patient has been compliant with the medication regimen.     Follow up with Mrs. Steph Sterling completed. No further questions or concerns. I will follow up in a few weeks to assess progress.     Patient states she is doing "great".    "

## 2018-01-30 RX ORDER — VERAPAMIL HYDROCHLORIDE 240 MG/1
CAPSULE, EXTENDED RELEASE ORAL
Qty: 90 CAPSULE | Refills: 0 | Status: SHIPPED | OUTPATIENT
Start: 2018-01-30 | End: 2018-04-30 | Stop reason: SDUPTHER

## 2018-01-31 ENCOUNTER — PATIENT OUTREACH (OUTPATIENT)
Dept: OTHER | Facility: OTHER | Age: 48
End: 2018-01-31

## 2018-01-31 NOTE — PROGRESS NOTES
Last 5 Patient Entered Readings                                      Current 30 Day Average: 137/90     Recent Readings 1/25/2018 1/24/2018 1/16/2018 1/15/2018 1/9/2018    SBP (mmHg) 134 152 125 137 133    DBP (mmHg) 84 98 86 92 90    Pulse 103 90 103 84 95        Ms. Cary BP has been erratic over the past week. She has no explanation for this. She has been checking BP in the same manner, using the correct technique. She has been having headaches, but is not sure they are related to BP since most BP readings are not elevated. She will be seeing Dr. Espinoza tomorrow to discuss BP and headaches. Explained that BP is not overly uncontrolled, but there are the occasional elevated readings. She does not always repeat her BP after a high reading, so asked that she repeat her BP next time this happens. Asked that she bring her BP cuff with her to have it compared to the BP in the clinic. If BP readings are accurate and remain elevated, will discuss starting chlorthalidone.     Will continue to monitor regularly. Will follow up in 2-3 weeks, sooner if BP begins to trend upward or downward.    Patient has my contact information and knows to call with any concerns or clinical changes.     Current HTN regimen:  Hypertension Medications             verapamil (VERELAN) 240 MG C24P TAKE 1 CAPSULE(240 MG) BY MOUTH EVERY DAY

## 2018-02-08 ENCOUNTER — OFFICE VISIT (OUTPATIENT)
Dept: FAMILY MEDICINE | Facility: CLINIC | Age: 48
End: 2018-02-08
Payer: COMMERCIAL

## 2018-02-08 ENCOUNTER — LAB VISIT (OUTPATIENT)
Dept: LAB | Facility: HOSPITAL | Age: 48
End: 2018-02-08
Attending: NURSE PRACTITIONER
Payer: COMMERCIAL

## 2018-02-08 VITALS
HEART RATE: 97 BPM | BODY MASS INDEX: 47.09 KG/M2 | SYSTOLIC BLOOD PRESSURE: 140 MMHG | OXYGEN SATURATION: 99 % | WEIGHT: 293 LBS | DIASTOLIC BLOOD PRESSURE: 86 MMHG | TEMPERATURE: 98 F | HEIGHT: 66 IN

## 2018-02-08 DIAGNOSIS — R63.1 POLYDIPSIA: ICD-10-CM

## 2018-02-08 DIAGNOSIS — I10 ESSENTIAL HYPERTENSION: Primary | ICD-10-CM

## 2018-02-08 DIAGNOSIS — Z83.3 FAMILY HISTORY OF DIABETES MELLITUS IN MOTHER: ICD-10-CM

## 2018-02-08 DIAGNOSIS — R10.30 LOWER ABDOMINAL PAIN: ICD-10-CM

## 2018-02-08 DIAGNOSIS — R35.89 POLYURIA: ICD-10-CM

## 2018-02-08 LAB
ESTIMATED AVG GLUCOSE: 105 MG/DL
HBA1C MFR BLD HPLC: 5.3 %

## 2018-02-08 PROCEDURE — 83036 HEMOGLOBIN GLYCOSYLATED A1C: CPT

## 2018-02-08 PROCEDURE — 99213 OFFICE O/P EST LOW 20 MIN: CPT | Mod: PO | Performed by: NURSE PRACTITIONER

## 2018-02-08 PROCEDURE — 99999 PR PBB SHADOW E&M-EST. PATIENT-LVL III: CPT | Mod: PBBFAC,,, | Performed by: NURSE PRACTITIONER

## 2018-02-08 PROCEDURE — 36415 COLL VENOUS BLD VENIPUNCTURE: CPT | Mod: PO

## 2018-02-08 PROCEDURE — 3008F BODY MASS INDEX DOCD: CPT | Mod: S$GLB,,, | Performed by: NURSE PRACTITIONER

## 2018-02-08 PROCEDURE — 99214 OFFICE O/P EST MOD 30 MIN: CPT | Mod: S$GLB,,, | Performed by: NURSE PRACTITIONER

## 2018-02-08 NOTE — PROGRESS NOTES
This dictation has been generated using Dragon Dictation some phonetic errors may occur.     Steph was seen today for hypertension, headache and vision problems.    Diagnoses and all orders for this visit:    Essential hypertension  -     Urinalysis; Future    Lower abdominal pain    Polyuria  -     Urinalysis; Future  -     Hemoglobin A1c; Future    Polydipsia  -     Urinalysis; Future  -     Hemoglobin A1c; Future    Family history of diabetes mellitus in mother  -     Hemoglobin A1c; Future      Hypertension borderline high at 132/88 per minute assessment.  Her machine read 182/110.  She needs to stop by the O bar and have it checked.   Lower abdominal pain polyuria check urinalysis rule out urinary tract infection.  I'll review and address accordingly.  Polyuria and polydipsia.  Her mother has diabetes.  Hemoglobin A1c from 15 months ago was normal.  Reassess at today's visit.    Follow-up if symptoms worsen or fail to improve.      ________________________________________________________________  ________________________________________________________________        Chief Complaint   Patient presents with    Hypertension    Headache    vision problems     History of present illness  This 47 y.o. presents today for complaint of abnormal blood pressure, frequent urination, excessive urination, excessive thirst, vision problems, and headache.  She does have a follow-up appointment for physical with her primary care physician on Monday but patient notes some complaints.  She's concerned because of variations in her blood pressure.  She is doing the digital monitoring and notes increasing blood pressures of lately despite med use and med compliance per her report.  She denies excessive salt intake.  Blood pressures have trended higher.  She checks her blood pressure at the visit with her machine and gets a reading of 182/110.  She has some tightness across the chest and neck and felt fatigue.  Denies any  shortness of breath dyspnea on exertion orthopnea.  She does work as a teacher.  She states that she does walk up 2 flights of steps without shortness of breath.  Patient notes frequent urination.  She notes excessive urination.  She does have excessive thirst.  She does have a family history of diabetes.  In November 2016 she had a normal A1c.  We discussed that symptoms can be sugar related.  She's had some vision problems.  She notes some blurry vision.  She denies any eye pain.  She saw the eye doctor last year.  We discussed association of headaches and vision problems versus vision problems from sugar.  If A1c is normal she needs to follow-up with ophthalmology.  Patient notes some lower abdominal pains.  She had a surgery at the end of last year and had her left ovary removed and bilateral fallopian tube.  She had some lingering pain and saw the surgeon.  It hasn't improved since December when they last spoke.  Denies any vaginal issues.  I encouraged her to follow with GYN.  Also discussed possibility of urinary tract infection however patient denies dysuria.  Difficult historian.    Review of systems  No fever or chills  Patient denies chest pain  No nausea vomiting or diarrhea.  No stool changes.  No rash    Past Medical History:   Diagnosis Date    HTN (hypertension) 12/26/2012    Migraine with acute onset aura 12/26/2012       Past Surgical History:   Procedure Laterality Date    ANKLE SURGERY Right 11-4-15    achilles tendon repair    VAGINAL DELIVERY         Family History   Problem Relation Age of Onset    Hypertension Father     Diabetes Mother     Hypertension Mother     Hypertension Maternal Grandmother     Stroke Maternal Grandfather        Social History     Social History    Marital status:      Spouse name: N/A    Number of children: N/A    Years of education: N/A     Social History Main Topics    Smoking status: Never Smoker    Smokeless tobacco: Never Used    Alcohol use  0.0 oz/week      Comment: occasional    Drug use: No    Sexual activity: Yes     Partners: Male      Comment: mirena     Other Topics Concern    None     Social History Narrative    None       Current Outpatient Prescriptions   Medication Sig Dispense Refill    verapamil (VERELAN) 240 MG C24P TAKE 1 CAPSULE(240 MG) BY MOUTH EVERY DAY 90 capsule 0    amoxicillin (AMOXIL) 875 MG tablet Take 875 mg by mouth every 12 (twelve) hours.       azelastine (ASTELIN) 137 mcg (0.1 %) nasal spray 1 spray by Nasal route 2 (two) times daily as needed.       benzonatate (TESSALON) 100 MG capsule Take 100 mg by mouth 3 (three) times daily as needed.       hydrocodone-acetaminophen 5-325mg (NORCO) 5-325 mg per tablet Take 1 tablet by mouth every 4 (four) hours as needed for Pain. 14 tablet 0    ibuprofen (ADVIL,MOTRIN) 800 MG tablet Take 1 tablet (800 mg total) by mouth every 8 (eight) hours as needed for Pain. 30 tablet 1    methocarbamol (ROBAXIN) 750 MG Tab Take 1 tablet (750 mg total) by mouth 3 (three) times daily. (Patient taking differently: Take 750 mg by mouth 3 (three) times daily as needed. ) 60 tablet 0    promethazine-dextromethorphan (PROMETHAZINE-DM) 6.25-15 mg/5 mL Syrp        No current facility-administered medications for this visit.        Review of patient's allergies indicates:  No Known Allergies    Physical examination  Vitals Reviewed  Gen. Well-dressed well-nourished no apparent distress  Skin warm dry and intact.  No rashes noted.  HEENT.  TM intact bilateral with normal light reflex.  No mastoid tenderness during percussion.  Nares patent bilateral.  Pharynx is unremarkable.  No maxillary or frontal sinus tenderness when percussed.    Neck is supple without adenopathy  Chest.  Respirations are even unlabored.  Lungs are clear to auscultation.  Cardiac regular rate and rhythm.  No chest wall adenopathy noted.  Neuro. Awake alert oriented x4.  Normal judgment and cognition noted.  Extremities no  clubbing cyanosis or edema noted.     Call or return to clinic prn if these symptoms worsen or fail to improve as anticipated.    Answers for HPI/ROS submitted by the patient on 2/7/2018   Hypertension  Chronicity: chronic  Onset: more than 1 year ago  Progression since onset: gradually worsening  Condition status: uncontrolled  anxiety: No  blurred vision: Yes  chest pain: Yes  headaches: Yes  malaise/fatigue: Yes  neck pain: Yes  orthopnea: No  palpitations: Yes  peripheral edema: No  PND: No  shortness of breath: No  sweats: No  Agents associated with hypertension: NSAIDs  CAD risks: obesity, stress  Compliance problems: exercise  Past treatments: lifestyle changes  Improvement on treatment: mild

## 2018-02-12 ENCOUNTER — OFFICE VISIT (OUTPATIENT)
Dept: FAMILY MEDICINE | Facility: CLINIC | Age: 48
End: 2018-02-12
Payer: COMMERCIAL

## 2018-02-12 VITALS
BODY MASS INDEX: 47.09 KG/M2 | WEIGHT: 293 LBS | SYSTOLIC BLOOD PRESSURE: 150 MMHG | OXYGEN SATURATION: 98 % | HEART RATE: 101 BPM | HEIGHT: 66 IN | TEMPERATURE: 99 F | DIASTOLIC BLOOD PRESSURE: 98 MMHG

## 2018-02-12 DIAGNOSIS — Z83.3 FAMILY HISTORY OF DIABETES MELLITUS: ICD-10-CM

## 2018-02-12 DIAGNOSIS — I10 ESSENTIAL HYPERTENSION: ICD-10-CM

## 2018-02-12 DIAGNOSIS — Z00.00 ROUTINE MEDICAL EXAM: Primary | ICD-10-CM

## 2018-02-12 DIAGNOSIS — R10.9 FLANK PAIN: ICD-10-CM

## 2018-02-12 DIAGNOSIS — R31.9 HEMATURIA, UNSPECIFIED TYPE: ICD-10-CM

## 2018-02-12 DIAGNOSIS — Z12.31 ENCOUNTER FOR SCREENING MAMMOGRAM FOR BREAST CANCER: ICD-10-CM

## 2018-02-12 DIAGNOSIS — G47.33 OSA (OBSTRUCTIVE SLEEP APNEA): ICD-10-CM

## 2018-02-12 DIAGNOSIS — R10.9 LEFT FLANK PAIN: ICD-10-CM

## 2018-02-12 DIAGNOSIS — E66.01 MORBID OBESITY: ICD-10-CM

## 2018-02-12 PROCEDURE — 99999 PR PBB SHADOW E&M-EST. PATIENT-LVL III: CPT | Mod: PBBFAC,,, | Performed by: INTERNAL MEDICINE

## 2018-02-12 PROCEDURE — 99396 PREV VISIT EST AGE 40-64: CPT | Mod: S$GLB,,, | Performed by: INTERNAL MEDICINE

## 2018-02-12 NOTE — PROGRESS NOTES
Chief complaint physical exam      47-year-old black female with hypertension.  History of physical.  She is due for mammogram and lab work.  Her lipids were normal in the past.  She has a family history diabetes.  .  We also discussed bariatric weight loss surgery and she should probably check with her insurance and also attend one of the free seminars.  She is up to 300 again.  She is able to lose weight.  A lot of daytime tiredness and symptoms of apnea according to her .  She is willing to do a sleep study after our discussion.  She also has some on and off left lower quadrant pain and left flank for the past 2 months.  Genitourinary frequency and continues to have some lately and recent urinalysis only shows some hematuria.  She is part of the digital hypertension program and her readings have been either very high or very low and she is going to have her blood pressure cuff checked today    ROS:   CONST: weight stable. EYES: no vision change. ENT: no sore throat. CV: no chest pain w/ exertion. RESP: no exertional shortness of breath. GI: no nausea, vomiting, diarrhea. No dysphagia. : no urinary issues. MUSCULOSKELETAL: no new myalgias or arthralgias. SKIN: no new changes. NEURO: no focal deficits. PSYCH: no new issues. ENDOCRINE: no polyuria. HEME: no lymph nodes. ALLERGY: no general pruritis.    PAST MEDICAL HISTORY:                                                        1.   Hypertension.                                                           2.   Abnormal TSH.  normal 3/12  3.   Gyn 6/12     4. normal chol 3/12  5. Migraines     6.  Left cervical radiculopathy   7.  Right Achilles tendon surgery 2015  8.  Left ovarian dermoid- both ovaries out 11/17  9.  Probable sleep apnea                                                                           FAMILY HISTORY:  Father 68 with hypertension.  Mom is 70 with hypertension   and diabetes.  Brothers 2 with hypertension.  No cancer in the family.     "                                                                                SOCIAL HISTORY:  She is  7 years, no prior and she has a child.       She does not smoke or drink.  Works as a teacher.     Gen: no distress  EYES: conjunctiva clear, non-icteric, PERRL  ENT: nose clear, nasal mucosa normal, oropharynx clear and moist, teeth good  NECK:supple, thyroid non-palpable  RESP: effort is good, lungs clear  CV: heart RRR w/o murmur, gallops or rubs; no carotid bruits, no edema  GI: abdomen soft, non-distended, non-tender,  MS: gait with crutches, no clubbing or cyanosis of the digits, right lower extremity in a brace  SKIN: no rashes, warm to touch      Labs and x-rays reviewed    Steph was seen today for annual exam.    Diagnoses and all orders for this visit:    Routine medical exam, arrange mammogram, work on weight loss    Essential hypertension, follows the digital hypertension    Morbid obesity, again discussed probable need for surgery    Family history of diabetes mellitus    Encounter for screening mammogram for breast cancer  -     Mammo Digital Screening Bilat with CAD; Future    Flank pain, rule out left hydronephrosis and kidney stone obstruction, may need to see urology if indeed hematuria and urinary frequency continue  -     Cancel: CT Renal Stone Study ABD Pelvis WO; Future  -     CT Renal Stone Study ABD Pelvis WO; Future    Left flank pain    Hematuria, unspecified type    MYA (obstructive sleep apnea)  -     Polysomnogram (CPAP will be added if patient meets diagnostic criteria.); Future         Based on patient's anxiety as well as documentation of sensitive issues such as her morbid obesity, access would not be therapeutic"This note will not be shared with the patient."    "

## 2018-02-14 ENCOUNTER — HOSPITAL ENCOUNTER (OUTPATIENT)
Dept: RADIOLOGY | Facility: HOSPITAL | Age: 48
Discharge: HOME | End: 2018-02-14
Attending: INTERNAL MEDICINE
Payer: COMMERCIAL

## 2018-02-14 VITALS — HEIGHT: 66 IN | BODY MASS INDEX: 47.09 KG/M2 | WEIGHT: 293 LBS

## 2018-02-14 DIAGNOSIS — R10.9 FLANK PAIN: ICD-10-CM

## 2018-02-14 DIAGNOSIS — Z12.31 ENCOUNTER FOR SCREENING MAMMOGRAM FOR BREAST CANCER: ICD-10-CM

## 2018-02-14 PROCEDURE — 77063 BREAST TOMOSYNTHESIS BI: CPT | Mod: 26,,, | Performed by: RADIOLOGY

## 2018-02-14 PROCEDURE — 77067 SCR MAMMO BI INCL CAD: CPT | Mod: 26,,, | Performed by: RADIOLOGY

## 2018-02-14 PROCEDURE — 74176 CT ABD & PELVIS W/O CONTRAST: CPT | Mod: TC

## 2018-02-14 PROCEDURE — 74176 CT ABD & PELVIS W/O CONTRAST: CPT | Mod: 26,,, | Performed by: RADIOLOGY

## 2018-02-14 PROCEDURE — 77067 SCR MAMMO BI INCL CAD: CPT | Mod: TC

## 2018-02-20 ENCOUNTER — PATIENT OUTREACH (OUTPATIENT)
Dept: OTHER | Facility: OTHER | Age: 48
End: 2018-02-20

## 2018-02-20 NOTE — LETTER
Sheba Lama, PharmD  [address] [place] [address]     Dear Steph,    We have made several attempts to encourage your participation in Digital Medicine monitoring. Unfortunately, we have been unsuccessful and this is an official notice that you are no longer enrolled in Hypertension Digital Medicine Program, and thus, we will no longer be managing your hypertension.    Please note this has no impact on your relationship with [facility] or your providers. Going forward, please reach out to your primary care provider with any questions or concerns regarding your health.                         Please contact [phone] if you have any additional questions.     Sincerely,    The [facility] Digital [facility] Team                                                                                                                                            Steph Sterling  [address] [address]

## 2018-02-20 NOTE — LETTER
Jeanine Dai  [address] [place] [address]     Dear Steph,    Thank you for enrolling in the [facility]. To participate in our program, we ask that you submit a blood pressure reading at least once weekly through your [facility] Account and maintain regular contact with your Care Team.  We have not received any data or heard from you in some time.     The Digital Medicine Care Team has attempted to reach you on multiple occasions to determine if you would like to continue participating in the program. While we encourage you to continue participating fully, we understand that circumstances may change.      To continue participating in the program, please contact me at [phone]. If we do not hear back, you will be un-enrolled and your physician will be notified of your decision.    If you have submitted blood pressure readings during the past 21 days and believe you are receiving this letter in error, please call the Digital Medicine Patient Support Line at [phone] for troubleshooting.      We look forward to hearing from you soon.    Sincerely,     Jeanine Dai  [facility]                                                                                                                         Steph Sterling  [address] [facility] [address]

## 2018-04-16 ENCOUNTER — PATIENT OUTREACH (OUTPATIENT)
Dept: OTHER | Facility: OTHER | Age: 48
End: 2018-04-16

## 2018-04-16 NOTE — PROGRESS NOTES
Last 5 Patient Entered Readings                                      Current 30 Day Average: 145/97     Recent Readings 4/14/2018 4/8/2018 3/25/2018 3/23/2018 3/15/2018    SBP (mmHg) 143 151 139 145 160    DBP (mmHg) 100 95 92 101 93    Pulse 103 92 92 99 98        5/29/18- Patient has not submitted BP readings since 4/19/18. Health  has been unable to reach her. Non-compliance protocol has been initiated by health Jeanine. Will follow up in 1 month.     7/2/18- Ms. Sterling has not responded to any forms of communication and has not checked her BP since 4/14/18. Patient will be removed from the [facility].

## 2018-04-30 RX ORDER — VERAPAMIL HYDROCHLORIDE 240 MG/1
CAPSULE, EXTENDED RELEASE ORAL
Qty: 90 CAPSULE | Refills: 0 | Status: SHIPPED | OUTPATIENT
Start: 2018-04-30 | End: 2018-07-28 | Stop reason: SDUPTHER

## 2018-07-29 RX ORDER — VERAPAMIL HYDROCHLORIDE 240 MG/1
CAPSULE, EXTENDED RELEASE ORAL
Qty: 90 CAPSULE | Refills: 0 | Status: SHIPPED | OUTPATIENT
Start: 2018-07-29 | End: 2018-10-28 | Stop reason: SDUPTHER

## 2018-08-01 ENCOUNTER — OFFICE VISIT (OUTPATIENT)
Dept: OBSTETRICS AND GYNECOLOGY | Facility: CLINIC | Age: 48
End: 2018-08-01
Payer: COMMERCIAL

## 2018-08-01 VITALS
BODY MASS INDEX: 47.09 KG/M2 | SYSTOLIC BLOOD PRESSURE: 130 MMHG | WEIGHT: 293 LBS | HEIGHT: 66 IN | DIASTOLIC BLOOD PRESSURE: 79 MMHG

## 2018-08-01 DIAGNOSIS — Z30.432 ENCOUNTER FOR IUD REMOVAL: ICD-10-CM

## 2018-08-01 DIAGNOSIS — Z00.00 ANNUAL PHYSICAL EXAM: Primary | ICD-10-CM

## 2018-08-01 DIAGNOSIS — Z12.4 SCREENING FOR CERVICAL CANCER: ICD-10-CM

## 2018-08-01 DIAGNOSIS — Z01.419 ENCOUNTER FOR GYNECOLOGICAL EXAMINATION WITHOUT ABNORMAL FINDING: ICD-10-CM

## 2018-08-01 PROCEDURE — 3008F BODY MASS INDEX DOCD: CPT | Mod: CPTII,S$GLB,, | Performed by: OBSTETRICS & GYNECOLOGY

## 2018-08-01 PROCEDURE — 88175 CYTOPATH C/V AUTO FLUID REDO: CPT

## 2018-08-01 PROCEDURE — 99999 PR PBB SHADOW E&M-EST. PATIENT-LVL III: CPT | Mod: PBBFAC,,, | Performed by: OBSTETRICS & GYNECOLOGY

## 2018-08-01 PROCEDURE — 99396 PREV VISIT EST AGE 40-64: CPT | Mod: 25,S$GLB,, | Performed by: OBSTETRICS & GYNECOLOGY

## 2018-08-01 PROCEDURE — 3075F SYST BP GE 130 - 139MM HG: CPT | Mod: CPTII,S$GLB,, | Performed by: OBSTETRICS & GYNECOLOGY

## 2018-08-01 PROCEDURE — 3078F DIAST BP <80 MM HG: CPT | Mod: CPTII,S$GLB,, | Performed by: OBSTETRICS & GYNECOLOGY

## 2018-08-01 PROCEDURE — 58301 REMOVE INTRAUTERINE DEVICE: CPT | Mod: S$GLB,,, | Performed by: OBSTETRICS & GYNECOLOGY

## 2018-08-01 NOTE — PATIENT INSTRUCTIONS

## 2018-08-01 NOTE — PROGRESS NOTES
Subjective:      Chief Complaint:    Chief Complaint   Patient presents with    Gynecologic Exam       Menstrual History:    OB History      Para Term  AB Living    1 1 1     1    SAB TAB Ectopic Multiple Live Births            1          Menarche age: 14     No LMP recorded. Patient has had an implant.                Objective:        History of Present Illness AND  Examination detailed DICTATE:  PRESENT ILLNESS AND PHYSICAL EXAMINATION NOTE:  The patient is here for annual   yearly exam.   1, para 1.  Past medical history, hypertension, migraine,   cystic teratoma on the left.  Surgery, laparoscopy, left salpingo-oophorectomy   and right salpingectomy and orthopedic surgery.  The patient also has a Mirena   IUD in that she wishes to have it removed.  No problem or complaint at the   present time.    PHYSICAL EXAMINATION:  VITAL SIGNS:  Blood pressure is 130/90, weight is 303.  Discussed weight   reduction and diet with the patient.  BREASTS:  No lumps, mass, discharge, skin changes, retraction or nipple changes.    Axilla is negative.  PELVIC:  External normal.  Vulva normal.  [place], urethral and [place] glands   are negative.  Vagina is clear.  Cervix clear.  IUD string noted.  Pap smear was   taken.  The IUD was removed since the patient does not need contraception and   both fallopian tubes removed.  Uterus, normal size, shape, position.  The left   adnexa is negative.  Right ovary is palpable.  No pain.  Good pelvic support.    No descensus.  RECTAL:  Negative.    IMPRESSION:  Essentially normal exam except being overweight, which has been   discussed.    PLAN:  Pap smear.  Removal of IUD.  Continue with multivitamin, increase   physical activity and recommended weight reduction.  We will see the patient   back within a year unless the Pap smear is abnormal.      JOVON  dd: 2018 09:26:03 (CDT)  td: 2018 15:50:41 (CDT)  Doc ID   #[id number]  Job ID #[id number]    CC:          Physical Exam   Constitutional: She is oriented to person, place, and time. She appears well-developed and well-nourished. No distress.   HENT:   Head: Normoce.  Eyes: Pupils are equal, round, and reactive to light.   Neck: Neck supple. No tracheal deviation present.   Cardiovascular: Normal rate, regular rhythm and normal heart sounds. No murmur heard.  Pulmonary/Chest: Effort normal and breath sounds normal. No respiratory distress. She has no wheezes   Abdominal: Bowel sounds are normal. She exhibits no distension and no mass. There is no tenderness. There is no rebound and no guarding..   Musculoskeletal: Normal range of motion.   Lymphadenopathy:        Right: No inguinal adenopathy present.        Left: No inguinal adenopathy present.  Skin: Skin is warm. No rash noted.        Review of Systems  Review of Systems   Normal ROS:   Constitutional: Negative for fever, chills, activity change fatigue and unexpected weight change.   HENT: Negative for nosebleeds, congestion.  Eyes: Negative for visual disturbance.   Respiratory: Negative for shortness of breath and wheezing.    Cardiovascular: Negative for chest pain, palpitations.   Gastrointestinal: Negative for abdominal pain, diarrhea, constipation, blood in stool and abdominal distention.   Musculoskeletal: Negative for back pain.   Allergic/Immunologic: Negative    Neurological: Negative   Hematological: Negative.   Psychiatric/Behavioral: Negative .    Assessment:      Diagnosis: ANNUAL   EXAM   GYN   EXAM    CANCER   SCREEN  PAP   IUD   REMOVAL       Plan:      Return in 12  months

## 2018-09-05 ENCOUNTER — APPOINTMENT (OUTPATIENT)
Dept: RADIOLOGY | Facility: HOSPITAL | Age: 48
End: 2018-09-05
Attending: INTERNAL MEDICINE
Payer: COMMERCIAL

## 2018-09-05 DIAGNOSIS — M54.50 LUMBAR PAIN: ICD-10-CM

## 2018-09-05 PROCEDURE — 72110 X-RAY EXAM L-2 SPINE 4/>VWS: CPT | Mod: 26,,, | Performed by: RADIOLOGY

## 2018-09-05 PROCEDURE — 72110 X-RAY EXAM L-2 SPINE 4/>VWS: CPT | Mod: TC,FY,PN

## 2018-09-12 PROBLEM — R73.01 IMPAIRED FASTING GLUCOSE: Status: ACTIVE | Noted: 2018-09-12

## 2018-09-12 PROBLEM — M25.551 BILATERAL HIP PAIN: Status: ACTIVE | Noted: 2018-09-12

## 2018-09-12 PROBLEM — M54.2 CERVICAL PAIN (NECK): Status: ACTIVE | Noted: 2018-09-12

## 2018-09-12 PROBLEM — R60.9 FLUID RETENTION: Status: ACTIVE | Noted: 2018-09-12

## 2018-09-12 PROBLEM — M54.50 LUMBAR PAIN: Status: ACTIVE | Noted: 2018-09-12

## 2018-09-12 PROBLEM — E87.70 HYPERVOLEMIA: Status: ACTIVE | Noted: 2018-09-12

## 2018-09-12 PROBLEM — R79.82 ELEVATED C-REACTIVE PROTEIN (CRP): Status: ACTIVE | Noted: 2018-09-12

## 2018-09-12 PROBLEM — M25.552 BILATERAL HIP PAIN: Status: ACTIVE | Noted: 2018-09-12

## 2018-09-12 PROBLEM — M62.838 MUSCLE SPASM: Status: ACTIVE | Noted: 2018-09-12

## 2018-10-03 PROBLEM — H68.102 OBSTRUCTION OF LEFT EUSTACHIAN TUBE: Status: ACTIVE | Noted: 2018-10-03

## 2018-10-03 PROBLEM — R51.9 FACE PAIN: Status: ACTIVE | Noted: 2018-10-03

## 2018-10-03 PROBLEM — H60.501 ACUTE OTITIS EXTERNA OF RIGHT EAR: Status: ACTIVE | Noted: 2018-10-03

## 2018-10-03 PROBLEM — H92.02 LEFT EAR PAIN: Status: ACTIVE | Noted: 2018-10-03

## 2018-10-28 RX ORDER — VERAPAMIL HYDROCHLORIDE 240 MG/1
CAPSULE, EXTENDED RELEASE ORAL
Qty: 90 CAPSULE | Refills: 0 | Status: SHIPPED | OUTPATIENT
Start: 2018-10-28 | End: 2018-12-27

## 2018-11-06 PROBLEM — M79.672 LEFT FOOT PAIN: Status: ACTIVE | Noted: 2018-11-06

## 2018-11-06 PROBLEM — M25.572 ACUTE LEFT ANKLE PAIN: Status: ACTIVE | Noted: 2018-11-06

## 2018-11-06 PROBLEM — Z23 FLU VACCINE NEED: Status: ACTIVE | Noted: 2018-11-06

## 2018-11-06 PROBLEM — M62.830 BACK SPASM: Status: ACTIVE | Noted: 2018-11-06

## 2018-11-06 PROBLEM — M77.9 TENDONITIS: Status: ACTIVE | Noted: 2018-11-06

## 2018-12-27 PROBLEM — R53.82 CHRONIC FATIGUE: Status: ACTIVE | Noted: 2018-12-27

## 2018-12-27 PROBLEM — K59.01 SLOW TRANSIT CONSTIPATION: Status: ACTIVE | Noted: 2018-12-27

## 2018-12-27 PROBLEM — R06.83 SNORING: Status: ACTIVE | Noted: 2018-12-27

## 2018-12-27 PROBLEM — M72.2 PLANTAR FASCIITIS, LEFT: Status: ACTIVE | Noted: 2018-12-27

## 2019-01-03 ENCOUNTER — TELEPHONE (OUTPATIENT)
Dept: OPTOMETRY | Facility: CLINIC | Age: 49
End: 2019-01-03

## 2019-01-03 NOTE — TELEPHONE ENCOUNTER
[employer] ins benefits as of 19:    Member Name : JER CONTEH  ID : [id number]  Group : Routine Exam   Patient Name : JOSE CONTEH  Relationship : SPOUSE   : 1970     Authorization Issued       Authorization Number: [id number]    Issue Date: 1/3/2019    Expiration Date: 2019    Services: Eye Examination    Examination Copayment: $25.00

## 2019-01-04 ENCOUNTER — OFFICE VISIT (OUTPATIENT)
Dept: OPTOMETRY | Facility: CLINIC | Age: 49
End: 2019-01-04
Payer: COMMERCIAL

## 2019-01-04 DIAGNOSIS — H52.7 REFRACTIVE ERROR: ICD-10-CM

## 2019-01-04 DIAGNOSIS — Z46.0 ENCOUNTER FOR FITTING OR ADJUSTMENT OF SPECTACLES OR CONTACT LENSES: Primary | ICD-10-CM

## 2019-01-04 DIAGNOSIS — Z01.00 ROUTINE EYE EXAM: Primary | ICD-10-CM

## 2019-01-04 PROCEDURE — 92014 PR EYE EXAM, EST PATIENT,COMPREHESV: ICD-10-PCS | Mod: S$GLB,,, | Performed by: OPTOMETRIST

## 2019-01-04 PROCEDURE — 99999 PR PBB SHADOW E&M-EST. PATIENT-LVL II: ICD-10-PCS | Mod: PBBFAC,,, | Performed by: OPTOMETRIST

## 2019-01-04 PROCEDURE — 92015 DETERMINE REFRACTIVE STATE: CPT | Mod: S$GLB,,, | Performed by: OPTOMETRIST

## 2019-01-04 PROCEDURE — 92014 COMPRE OPH EXAM EST PT 1/>: CPT | Mod: S$GLB,,, | Performed by: OPTOMETRIST

## 2019-01-04 PROCEDURE — 92310 PR CONTACT LENS FITTING (NO CHANGE): ICD-10-PCS | Mod: ,,, | Performed by: OPTOMETRIST

## 2019-01-04 PROCEDURE — 92310 CONTACT LENS FITTING OU: CPT | Mod: ,,, | Performed by: OPTOMETRIST

## 2019-01-04 PROCEDURE — 99499 NO LOS: ICD-10-PCS | Mod: S$GLB,,, | Performed by: OPTOMETRIST

## 2019-01-04 PROCEDURE — 99499 UNLISTED E&M SERVICE: CPT | Mod: S$GLB,,, | Performed by: OPTOMETRIST

## 2019-01-04 PROCEDURE — 99999 PR PBB SHADOW E&M-EST. PATIENT-LVL II: CPT | Mod: PBBFAC,,, | Performed by: OPTOMETRIST

## 2019-01-04 PROCEDURE — 92015 PR REFRACTION: ICD-10-PCS | Mod: S$GLB,,, | Performed by: OPTOMETRIST

## 2019-01-04 NOTE — PROGRESS NOTES
Subjective:       Patient ID: Steph Sterling is a 48 y.o. female      Chief Complaint   Patient presents with    Concerns About Ocular Health     History of Present Illness  Dls: 7/3/17 Dr. Helm     49 y/o female presents today for ocular health check.  Pt states no changes in vision.  Pt wears single vision glasses and scls.pt wants a new rx for both.      No tearing  No itching  No burning  No pain  + ha's  No floaters  No flashes     Eye meds  None    Acuvue Bobbi. Replaces them every 3-4 weeks. Sleeps in lenses 2-3 nights a week. SVL distance only with readers PRN.        Assessment/Plan:     1. Routine eye exam  [facility] [facility].    2. Refractive error  Educated patient on refractive error and discussed lens options. Dispensed updated spectacle Rx. Educated about adaptation period to new specs.    Eyeglass Final Rx     Eyeglass Final Rx       Sphere Cylinder Axis Add    Right -3.25 +0.50 115 +1.50    Left -3.00 Sphere  +1.50    Expiration Date:  1/5/2020                Contact lens Rx released to pt. Daily wear only advised, replacement monthly with education to risks of extended wear.  Discussed lens care, compliance and solutions. RTC yearly contact lens follow up.     Contact Lens Final Rx     Final Contact Lens Rx       Brand Base Curve Diameter Sphere Cylinder    Right Acuvue Bobbi 8.4 14.0 -3.00 Sphere    Left Acuvue Bobbi 8.4 14.0 -3.00 Sphere    Expiration Date:  1/5/2020    Replacement:  Monthly    Solutions:  OptiFree PureMoist    Wearing Schedule:  Daily wear                  Follow-up in about 1 year (around 1/4/2020) for Annual eye exam, Contact Lens Follow Up.

## 2019-01-30 RX ORDER — VERAPAMIL HYDROCHLORIDE 240 MG/1
CAPSULE, EXTENDED RELEASE ORAL
Qty: 90 CAPSULE | Refills: 0 | Status: SHIPPED | OUTPATIENT
Start: 2019-01-30 | End: 2019-02-07

## 2019-02-07 PROBLEM — I10 HTN, GOAL BELOW 140/90: Status: ACTIVE | Noted: 2019-02-07

## 2019-02-07 PROBLEM — E66.9 OBESITY (BMI 30-39.9): Status: ACTIVE | Noted: 2019-02-07

## 2019-03-07 PROBLEM — R51.9 FACE PAIN: Status: RESOLVED | Noted: 2018-10-03 | Resolved: 2019-03-07

## 2019-03-07 PROBLEM — Z23 FLU VACCINE NEED: Status: RESOLVED | Noted: 2018-11-06 | Resolved: 2019-03-07

## 2019-03-07 PROBLEM — E87.70 HYPERVOLEMIA: Status: RESOLVED | Noted: 2018-09-12 | Resolved: 2019-03-07

## 2019-03-07 PROBLEM — N39.0 URINARY TRACT INFECTION, SITE NOT SPECIFIED: Status: RESOLVED | Noted: 2017-08-29 | Resolved: 2019-03-07

## 2019-03-07 PROBLEM — H92.02 LEFT EAR PAIN: Status: RESOLVED | Noted: 2018-10-03 | Resolved: 2019-03-07

## 2019-03-07 PROBLEM — H25.13 NUCLEAR SCLEROSIS OF BOTH EYES: Status: RESOLVED | Noted: 2017-07-03 | Resolved: 2019-03-07

## 2019-03-07 PROBLEM — H52.7 REFRACTIVE ERROR: Status: RESOLVED | Noted: 2019-01-04 | Resolved: 2019-03-07

## 2019-03-07 PROBLEM — H60.501 ACUTE OTITIS EXTERNA OF RIGHT EAR: Status: RESOLVED | Noted: 2018-10-03 | Resolved: 2019-03-07

## 2019-03-07 PROBLEM — Z12.4 SCREENING FOR CERVICAL CANCER: Status: RESOLVED | Noted: 2018-08-01 | Resolved: 2019-03-07

## 2019-03-07 PROBLEM — H68.102 OBSTRUCTION OF LEFT EUSTACHIAN TUBE: Status: RESOLVED | Noted: 2018-10-03 | Resolved: 2019-03-07

## 2019-03-07 PROBLEM — Z30.9 ENCOUNTER FOR CONTRACEPTIVE MANAGEMENT: Status: RESOLVED | Noted: 2017-08-01 | Resolved: 2019-03-07

## 2019-03-07 PROBLEM — N83.209 CYST OF OVARY: Status: RESOLVED | Noted: 2017-11-15 | Resolved: 2019-03-07

## 2020-01-11 NOTE — BRIEF OP NOTE
[facility]  Brief Operative Note    SUMMARY     Surgery Date: 11/15/2017     Surgeon(s) and Role:     * Monroe Marrero MD - Primary     * Patrick Conley MD - Assisting        Pre-op Diagnosis:  Sterilization [Z30.2]  Dermoid tumor [D36.9]    Post-op Diagnosis:  Post-Op Diagnosis Codes:     * Sterilization [Z30.2]     * Dermoid tumor [D36.9]    Procedure(s) (LRB):  OOPHORECTOMY-LAPAROSCOPIC with bilateral salpingectomy (Left)    Anesthesia: General    Description of Procedure: uncomplicated LSO w/ R salpingectomy    Description of the findings of the procedure: probable dermoid cyst of L ovary with endometrioma noted as well.  Moderate amount of endometriosis noted in posterior cul-de-sac and bladder    Estimated Blood Loss: * No values recorded between 11/15/2017 11:16 AM and 11/15/2017 12:04 PM *         Specimens:   Specimen (12h ago through future)    Start     Ordered    11/15/17 1146  Specimen to Pathology - Surgery  Once     Comments:  Left tube and ovaryRight tube      11/15/17 0942         [id number]:;

## 2020-05-06 ENCOUNTER — TELEPHONE (OUTPATIENT)
Dept: OTOLARYNGOLOGY | Facility: CLINIC | Age: 50
End: 2020-05-06

## 2020-05-06 NOTE — TELEPHONE ENCOUNTER
----- Message from Raiza Tyson sent at 5/6/2020  2:14 PM CDT -----  Contact: Patient [phone]  Name of Caller Patient    Reason for Visit/Symptoms Ear Problems. Clogged-Stopped up. Not getting full sound    Best Contact Number or Confirm if Mychart [phone]    Preferred Date/Time of Appointment Sometime this week    Interested in Virtual Visit (yes/no) No

## 2020-05-07 ENCOUNTER — OFFICE VISIT (OUTPATIENT)
Dept: OTOLARYNGOLOGY | Facility: CLINIC | Age: 50
End: 2020-05-07
Payer: COMMERCIAL

## 2020-05-07 VITALS
SYSTOLIC BLOOD PRESSURE: 130 MMHG | WEIGHT: 272.06 LBS | DIASTOLIC BLOOD PRESSURE: 90 MMHG | BODY MASS INDEX: 43.72 KG/M2 | HEIGHT: 66 IN

## 2020-05-07 DIAGNOSIS — H61.22 IMPACTED CERUMEN OF LEFT EAR: Primary | ICD-10-CM

## 2020-05-07 DIAGNOSIS — H60.392 OTHER INFECTIVE OTITIS EXTERNA OF LEFT EAR, UNSPECIFIED CHRONICITY: ICD-10-CM

## 2020-05-07 PROCEDURE — 69210 PR REMOVAL IMPACTED CERUMEN REQUIRING INSTRUMENTATION, UNILATERAL: ICD-10-PCS | Mod: S$GLB,,, | Performed by: OTOLARYNGOLOGY

## 2020-05-07 PROCEDURE — 99203 PR OFFICE/OUTPT VISIT, NEW, LEVL III, 30-44 MIN: ICD-10-PCS | Mod: 25,S$GLB,, | Performed by: OTOLARYNGOLOGY

## 2020-05-07 PROCEDURE — 3080F DIAST BP >= 90 MM HG: CPT | Mod: CPTII,S$GLB,, | Performed by: OTOLARYNGOLOGY

## 2020-05-07 PROCEDURE — 3075F SYST BP GE 130 - 139MM HG: CPT | Mod: CPTII,S$GLB,, | Performed by: OTOLARYNGOLOGY

## 2020-05-07 PROCEDURE — 99203 OFFICE O/P NEW LOW 30 MIN: CPT | Mod: 25,S$GLB,, | Performed by: OTOLARYNGOLOGY

## 2020-05-07 PROCEDURE — 3008F PR BODY MASS INDEX (BMI) DOCUMENTED: ICD-10-PCS | Mod: CPTII,S$GLB,, | Performed by: OTOLARYNGOLOGY

## 2020-05-07 PROCEDURE — 3075F PR MOST RECENT SYSTOLIC BLOOD PRESS GE 130-139MM HG: ICD-10-PCS | Mod: CPTII,S$GLB,, | Performed by: OTOLARYNGOLOGY

## 2020-05-07 PROCEDURE — 69210 REMOVE IMPACTED EAR WAX UNI: CPT | Mod: S$GLB,,, | Performed by: OTOLARYNGOLOGY

## 2020-05-07 PROCEDURE — 3080F PR MOST RECENT DIASTOLIC BLOOD PRESSURE >= 90 MM HG: ICD-10-PCS | Mod: CPTII,S$GLB,, | Performed by: OTOLARYNGOLOGY

## 2020-05-07 PROCEDURE — 3008F BODY MASS INDEX DOCD: CPT | Mod: CPTII,S$GLB,, | Performed by: OTOLARYNGOLOGY

## 2020-05-07 RX ORDER — LOSARTAN POTASSIUM AND HYDROCHLOROTHIAZIDE 12.5; 5 MG/1; MG/1
TABLET ORAL
COMMUNITY
Start: 2020-04-14

## 2020-05-07 RX ORDER — CLOTRIMAZOLE AND BETAMETHASONE DIPROPIONATE 10; .64 MG/G; MG/G
CREAM TOPICAL 2 TIMES DAILY
Qty: 1 TUBE | Refills: 0 | Status: SHIPPED | OUTPATIENT
Start: 2020-05-07

## 2020-05-07 NOTE — PROGRESS NOTES
Otolaryngology Clinic Note  Date:  05/07/2020     Chief complaint:  Chief Complaint   Patient presents with    Cerumen Impaction    Ear Fullness       History of Present Illness  Steph Sterling is a 49 y.o. female  presenting today for a new evaluation and treatment of ear pain and muffled hearing.     The patient reports symptoms started in February . She had her ear flushed which helped but then symptoms returned around east. She has been using debrox. She only has problems with the left ear, never the right. She hears a static noise in her ear and hearing is muffled.  it feels wet and sticky in her ear but she has not had any otorrhea. Ear itches. She has a history of scalp dandruff. She reports discomfort in the ear that also sometimes radiates along her face.  She has postnasal drip but no itching of eyes/nose and no congestion nor rhinorrhea to suggest seasonal allergies.       Past Medical History  Past Medical History:   Diagnosis Date    Achilles tendinitis     Cataract     HTN (hypertension) 12/26/2012    Migraine with acute onset aura 12/26/2012    Obesity     Ovarian cyst         Past Surgical History  Past Surgical History:   Procedure Laterality Date    ANKLE SURGERY Right 11-4-15    achilles tendon repair    SALPINGOOPHORECTOMY  11/15/2017    VAGINAL DELIVERY          Medications  Current Outpatient Medications on File Prior to Visit   Medication Sig Dispense Refill    losartan-hydrochlorothiazide 50-12.5 mg (HYZAAR) 50-12.5 mg per tablet       diclofenac sodium (VOLTAREN) 1 % Gel Apply 2 g topically 4 (four) times daily. for 10 days 100 g 0    methocarbamol (ROBAXIN) 500 MG Tab Take 500 mg by mouth once daily.       No current facility-administered medications on file prior to visit.        Review of Systems  Review of Systems   Constitutional: Negative for fever.   HENT: Positive for ear pain, hearing loss and tinnitus. Negative for congestion and sinus pain.    Eyes: Negative for  "blurred vision.   Respiratory: Negative for cough.    Cardiovascular: Negative for chest pain.   Musculoskeletal: Negative for neck pain.   Skin: Positive for itching.   Neurological: Negative for dizziness.   Endo/Heme/Allergies: Negative for environmental allergies.        Social History   reports that she has never smoked. She has never used smokeless tobacco. She reports that she drinks alcohol. She reports that she does not use drugs.     Family History  Family History   Problem Relation Age of Onset    Hypertension Father     Diabetes Mother     Hypertension Mother     Cataracts Mother     Hypertension Maternal Grandmother     Cataracts Maternal Grandmother     Stroke Maternal Grandfather         Physical Exam   Vitals:    05/07/20 1315   BP: (!) 130/90    Body mass index is 43.91 kg/m².  Weight: 123.4 kg (272 lb 0.8 oz)   Height: 5' 6" (167.6 cm)     GENERAL: alert, no acute distress.  HEAD: normocephalic.   SKIN: no lesions of skin of head and neck area.  EYES: lids and lashes normal. Pupils equal Extraocular motions intact. No proptosis  EARS: external ear without lesion, normal pinna shape and position. Right  External auditory canal with normal cerumen, tympanic membrane fully visible, no perforation , no retraction. No middle ear effusion. Ossicles intact. See microscope exam note  NOSE: external nose without abnormality  NECK: trachea midline. No discrete palpable thyroid nodule. No parotid mass nor tenderness. No submandibular gland mass nor tenderness. No scars.  LYMPH NODES:No cervical lymphadenopathy.  RESPIRATORY: no stridor, no stertor. Voice normal. Respirations nonlabored.  NEURO: alert, responds to questions appropriately.    Facial movement symmetric with good eye closure and symmetric smile.   PSYCH:mood appropriate    Procedure performed: microscopic examination of ears with cerumen disimpaction    Indication for procedure: unilateral cerumen impaction     Description of " procedure:  After verbal consent was obtained, the patient was positioned in semi recumbent position and speculum was placed in the  left  ear and microscope brought into the field.  The microscope was positioned and magnification adjusted for appropriate visualization. Otologic instruments including various size otologic suctions and curette were used to remove the cerumen from the bilateral external auditory canals under visualization with the operating microscope. After cleaning, visualization was again performed and at various levels of higher magnification to optimize views of the ear canal, tympanic membrane, ossicles and middle ear space. Findings as indicated below. All portions of the procedure and examination by otomicroscopy were tolerated well without complication.     Findings:   Complete cerumen impaction removed entirely - cerumen was wet and whitish in quantity. No fungal spores but some fluffy white material near TM. TM no irritated in appearance and no middle ear effusion. Suspect cerumen appearance related to debrox use, however could have underlying fungal infection.      Imaging:  The patient does not have any pertinent and/or recent imaging of the head and neck.       Assessment  1. Impacted cerumen of left ear    2. Other infective otitis externa of left ear, unspecified chronicity       Plan:  Discussed plan of care with patient in detail and all questions answered. Patient reported understanding of plan of care.    likely just wet cerumen from debrox use but may have fungal infection developing. rx for cream given , she should start this if still having fullness and itching tomorrow.   Hearing improved after cleaning  F/u next week for repeat exam

## 2020-05-13 ENCOUNTER — OFFICE VISIT (OUTPATIENT)
Dept: OTOLARYNGOLOGY | Facility: CLINIC | Age: 50
End: 2020-05-13
Payer: COMMERCIAL

## 2020-05-13 VITALS
WEIGHT: 275.13 LBS | HEIGHT: 66 IN | SYSTOLIC BLOOD PRESSURE: 130 MMHG | BODY MASS INDEX: 44.22 KG/M2 | TEMPERATURE: 99 F | DIASTOLIC BLOOD PRESSURE: 80 MMHG

## 2020-05-13 DIAGNOSIS — H60.392 OTHER INFECTIVE CHRONIC OTITIS EXTERNA OF LEFT EAR: Primary | ICD-10-CM

## 2020-05-13 PROCEDURE — 99212 PR OFFICE/OUTPT VISIT, EST, LEVL II, 10-19 MIN: ICD-10-PCS | Mod: S$GLB,,, | Performed by: OTOLARYNGOLOGY

## 2020-05-13 PROCEDURE — 3079F PR MOST RECENT DIASTOLIC BLOOD PRESSURE 80-89 MM HG: ICD-10-PCS | Mod: CPTII,S$GLB,, | Performed by: OTOLARYNGOLOGY

## 2020-05-13 PROCEDURE — 3075F PR MOST RECENT SYSTOLIC BLOOD PRESS GE 130-139MM HG: ICD-10-PCS | Mod: CPTII,S$GLB,, | Performed by: OTOLARYNGOLOGY

## 2020-05-13 PROCEDURE — 3008F BODY MASS INDEX DOCD: CPT | Mod: CPTII,S$GLB,, | Performed by: OTOLARYNGOLOGY

## 2020-05-13 PROCEDURE — 99212 OFFICE O/P EST SF 10 MIN: CPT | Mod: S$GLB,,, | Performed by: OTOLARYNGOLOGY

## 2020-05-13 PROCEDURE — 3075F SYST BP GE 130 - 139MM HG: CPT | Mod: CPTII,S$GLB,, | Performed by: OTOLARYNGOLOGY

## 2020-05-13 PROCEDURE — 3079F DIAST BP 80-89 MM HG: CPT | Mod: CPTII,S$GLB,, | Performed by: OTOLARYNGOLOGY

## 2020-05-13 PROCEDURE — 3008F PR BODY MASS INDEX (BMI) DOCUMENTED: ICD-10-PCS | Mod: CPTII,S$GLB,, | Performed by: OTOLARYNGOLOGY

## 2020-05-13 NOTE — PROGRESS NOTES
Otolaryngology Clinic Note  Date:  05/13/2020     Chief complaint:  Chief Complaint   Patient presents with    Follow-up     Left Ear       History of Present Illness  Steph Sterling is a 49 y.o. female  presenting today for a follow up of ear pain and muffled hearing. At last visit she was noted to have a cerumen impaction that was removed, however she had some findings that seemed like she may have chronic fungal otitis. She has a history of scalp dandruff -uses head and shoulders, alternates with neutrogena    She is still having significant itching of the ear. She has not tried the topical cream yet.      Past Medical History  Past Medical History:   Diagnosis Date    Achilles tendinitis     Cataract     HTN (hypertension) 12/26/2012    Migraine with acute onset aura 12/26/2012    Obesity     Ovarian cyst         Past Surgical History  Past Surgical History:   Procedure Laterality Date    ANKLE SURGERY Right 11-4-15    achilles tendon repair    SALPINGOOPHORECTOMY  11/15/2017    VAGINAL DELIVERY          Medications  Current Outpatient Medications on File Prior to Visit   Medication Sig Dispense Refill    clotrimazole-betamethasone 1-0.05% (LOTRISONE) cream Apply topically 2 (two) times daily. Apply small amount to ear canal 1 Tube 0    losartan-hydrochlorothiazide 50-12.5 mg (HYZAAR) 50-12.5 mg per tablet       methocarbamol (ROBAXIN) 500 MG Tab Take 500 mg by mouth once daily.      diclofenac sodium (VOLTAREN) 1 % Gel Apply 2 g topically 4 (four) times daily. for 10 days 100 g 0     No current facility-administered medications on file prior to visit.        Review of Systems  Review of Systems   Constitutional: Negative for fever.   HENT: Negative for ear pain and hearing loss.         Ear itching     Skin:        No significant worsening of dandruff          Physical Exam   Vitals:    05/13/20 1012   BP: 130/80   Temp: 98.8 °F (37.1 °C)    Body mass index is 44.41 kg/m².  Weight: 124.8 kg  "(275 lb 2.2 oz)   Height: 5' 6" (167.6 cm)     GENERAL: alert, no acute distress.  HEAD: normocephalic.   SKIN: no lesions of skin of head and neck area.  EYES: lids and lashes normal.   EARS: external ear without lesion, normal pinna shape and position. Right  External auditory canal with normal cerumen, tympanic membrane fully visible, no perforation , no retraction. No middle ear effusion. Ossicles intact. Left tm with mild retraction and scant white debris on the TM, no middle ear effusion  NOSE: external nose without abnormality  RESPIRATORY: no stridor, no stertor. Voice normal. Respirations nonlabored.  NEURO: alert, responds to questions appropriately.   PSYCH:mood appropriate    Assessment  1. Other infective chronic otitis externa of left ear       Plan:  stil with white fluffy material in ear  Use cream for 7-10 days  May need to add rx shampoo if not completely controlled.  F/u in 2 weeks if still with issues    "

## 2020-07-10 ENCOUNTER — OFFICE VISIT (OUTPATIENT)
Dept: OPTOMETRY | Facility: CLINIC | Age: 50
End: 2020-07-10
Payer: COMMERCIAL

## 2020-07-10 DIAGNOSIS — I10 HYPERTENSION, UNSPECIFIED TYPE: Primary | ICD-10-CM

## 2020-07-10 DIAGNOSIS — H52.7 REFRACTIVE ERROR: ICD-10-CM

## 2020-07-10 DIAGNOSIS — Z46.0 ENCOUNTER FOR FITTING OR ADJUSTMENT OF SPECTACLES OR CONTACT LENSES: Primary | ICD-10-CM

## 2020-07-10 PROCEDURE — 92015 PR REFRACTION: ICD-10-PCS | Mod: S$GLB,,, | Performed by: OPTOMETRIST

## 2020-07-10 PROCEDURE — 92310 PR CONTACT LENS FITTING (NO CHANGE): ICD-10-PCS | Mod: CSM,,, | Performed by: OPTOMETRIST

## 2020-07-10 PROCEDURE — 92014 PR EYE EXAM, EST PATIENT,COMPREHESV: ICD-10-PCS | Mod: S$GLB,,, | Performed by: OPTOMETRIST

## 2020-07-10 PROCEDURE — 99499 NO LOS: ICD-10-PCS | Mod: S$GLB,,, | Performed by: OPTOMETRIST

## 2020-07-10 PROCEDURE — 92310 CONTACT LENS FITTING OU: CPT | Mod: CSM,,, | Performed by: OPTOMETRIST

## 2020-07-10 PROCEDURE — 99999 PR PBB SHADOW E&M-EST. PATIENT-LVL II: CPT | Mod: PBBFAC,,, | Performed by: OPTOMETRIST

## 2020-07-10 PROCEDURE — 99999 PR PBB SHADOW E&M-EST. PATIENT-LVL II: ICD-10-PCS | Mod: PBBFAC,,, | Performed by: OPTOMETRIST

## 2020-07-10 PROCEDURE — 99499 UNLISTED E&M SERVICE: CPT | Mod: S$GLB,,, | Performed by: OPTOMETRIST

## 2020-07-10 PROCEDURE — 92014 COMPRE OPH EXAM EST PT 1/>: CPT | Mod: S$GLB,,, | Performed by: OPTOMETRIST

## 2020-07-10 PROCEDURE — 92015 DETERMINE REFRACTIVE STATE: CPT | Mod: S$GLB,,, | Performed by: OPTOMETRIST

## 2020-07-10 NOTE — PROGRESS NOTES
Subjective:       Patient ID: Steph Sterling is a 50 y.o. female      Chief Complaint   Patient presents with    Concerns About Ocular Health    Contact Lens Follow Up    Hypertensive Eye Exam     History of Present Illness  HPI     Dls: 1/4/19 Dr. Helm     49 y/o female presents today for hypertensive eye exam.   Pt c/o blurry vision at distance and near ou.   Pt wears scls and single vision glasses.    No tearing  No itching  No burning  No pain  + ha's  No floaters  No flashes    Eye meds  Visine ou prn     Bobbi lenses. DVO with readers. Replacing monthly. Sleeps in lenses   occasionally.         Assessment/Plan:     1. Hypertension, unspecified type  No hypertensive retinopathy. Continue BP control. RTC 1 year for DFE.    2. Refractive error  Educated patient on refractive error and discussed lens options. Dispensed updated spectacle Rx. Educated about adaptation period to new specs.    Eyeglass Final Rx     Eyeglass Final Rx       Sphere Cylinder Axis Add    Right -3.00 +0.50 095 +2.00    Left -3.00 +0.25 140 +2.00    Expiration Date: 7/11/2021                DVO with readers PRN. Contact lens Rx released to pt. Daily wear only advised, replacement monthly with education to risks of extended wear.  Discussed lens care, compliance and solutions. RTC yearly contact lens follow up.     Contact Lens Final Rx     Final Contact Lens Rx       Brand Base Curve Diameter Sphere Cylinder    Right Acuvue Bobbi 8.4 14.0 -2.75 Sphere    Left Acuvue Bobbi 8.4 14.0 -2.75 Sphere    Expiration Date: 7/11/2021    Replacement: Monthly    Solutions: OptiFree PureMoist    Wearing Schedule: Daily wear                  Follow up in about 1 year (around 7/10/2021).

## 2020-07-16 ENCOUNTER — TELEPHONE (OUTPATIENT)
Dept: OTOLARYNGOLOGY | Facility: CLINIC | Age: 50
End: 2020-07-16

## 2020-07-20 ENCOUNTER — OFFICE VISIT (OUTPATIENT)
Dept: OTOLARYNGOLOGY | Facility: CLINIC | Age: 50
End: 2020-07-20
Payer: COMMERCIAL

## 2020-07-20 VITALS
TEMPERATURE: 98 F | HEIGHT: 66 IN | BODY MASS INDEX: 44.72 KG/M2 | SYSTOLIC BLOOD PRESSURE: 124 MMHG | DIASTOLIC BLOOD PRESSURE: 82 MMHG | WEIGHT: 278.25 LBS

## 2020-07-20 DIAGNOSIS — H92.02 LEFT EAR PAIN: Primary | ICD-10-CM

## 2020-07-20 DIAGNOSIS — H91.90 HEARING LOSS, UNSPECIFIED HEARING LOSS TYPE, UNSPECIFIED LATERALITY: ICD-10-CM

## 2020-07-20 DIAGNOSIS — H69.93 DYSFUNCTION OF BOTH EUSTACHIAN TUBES: ICD-10-CM

## 2020-07-20 PROCEDURE — 3074F SYST BP LT 130 MM HG: CPT | Mod: CPTII,S$GLB,, | Performed by: OTOLARYNGOLOGY

## 2020-07-20 PROCEDURE — 3008F PR BODY MASS INDEX (BMI) DOCUMENTED: ICD-10-PCS | Mod: CPTII,S$GLB,, | Performed by: OTOLARYNGOLOGY

## 2020-07-20 PROCEDURE — 3079F PR MOST RECENT DIASTOLIC BLOOD PRESSURE 80-89 MM HG: ICD-10-PCS | Mod: CPTII,S$GLB,, | Performed by: OTOLARYNGOLOGY

## 2020-07-20 PROCEDURE — 3079F DIAST BP 80-89 MM HG: CPT | Mod: CPTII,S$GLB,, | Performed by: OTOLARYNGOLOGY

## 2020-07-20 PROCEDURE — 3008F BODY MASS INDEX DOCD: CPT | Mod: CPTII,S$GLB,, | Performed by: OTOLARYNGOLOGY

## 2020-07-20 PROCEDURE — 99213 OFFICE O/P EST LOW 20 MIN: CPT | Mod: S$GLB,,, | Performed by: OTOLARYNGOLOGY

## 2020-07-20 PROCEDURE — 3074F PR MOST RECENT SYSTOLIC BLOOD PRESSURE < 130 MM HG: ICD-10-PCS | Mod: CPTII,S$GLB,, | Performed by: OTOLARYNGOLOGY

## 2020-07-20 PROCEDURE — 99213 PR OFFICE/OUTPT VISIT, EST, LEVL III, 20-29 MIN: ICD-10-PCS | Mod: S$GLB,,, | Performed by: OTOLARYNGOLOGY

## 2020-07-20 RX ORDER — FLUTICASONE PROPIONATE 50 MCG
1 SPRAY, SUSPENSION (ML) NASAL 2 TIMES DAILY
Qty: 18.2 ML | Refills: 3 | Status: SHIPPED | OUTPATIENT
Start: 2020-07-20

## 2020-07-20 RX ORDER — AZELASTINE 1 MG/ML
1 SPRAY, METERED NASAL 2 TIMES DAILY
Qty: 30 ML | Refills: 3 | Status: SHIPPED | OUTPATIENT
Start: 2020-07-20

## 2020-07-20 NOTE — PROGRESS NOTES
Otolaryngology Clinic Note  Date:  07/20/2020     Chief complaint:  Chief Complaint   Patient presents with    Ear Fullness     left ear       History of Present Illness  Steph Sterling is a 50 y.o. female  presenting today for a follow up of ear pain and muffled hearing.   Her ears started itching again last week - she used the cream which has helped but she still is having some itching and full sensation. Occasionally feeling fluid in left ear and feels muffled . Sometimes right ear may be clogged also.     She was initially seen on 5/7/20 with cerumen impaction that was removed. I had her follow up with me on 5/13/20 as her ear looked like it was possibly developing and infection . I started her on lotrisone cream for the ear.   When she initially presented to me on 5-7-20 she noted  symptoms started in February . At that time, she had her ear flushed which helped but then symptoms returned around east and did not resolve with debrox. At that time she had static noise in her ear and muffled hearing with ear itching and ear discomfort.      Historically, she only has problems with the left ear, never the right.     Review of systems to evaluate for risk factors such as LPR or allergic rhinitis that could cause eustachian tube dysfunction were postnasal drip.  no itching of eyes/nose and no nasal congestion nor rhinorrhea to suggest seasonal allergies. No globus, does sometimes get throat clearing at night.      risk factors for chronic otitis externa includes  a history of scalp dandruff -uses head and shoulders, alternates with neutrogena     Past Medical History  Diagnosis Date    Achilles tendinitis     Cataract     HTN (hypertension) 12/26/2012    Migraine with acute onset aura 12/26/2012    Obesity     Ovarian cyst         Past Surgical History  Procedure Laterality Date    ANKLE SURGERYachilles tendon repair Right 11-4-15    SALPINGOOPHORECTOMY  11/15/2017    VAGINAL DELIVERY           Medications  Current Outpatient Medications on File Prior to Visit   Medication Sig Dispense Refill    clotrimazole-betamethasone 1-0.05% (LOTRISONE) cream Apply topically 2 (two) times daily. Apply small amount to ear canal 1 Tube 0    diclofenac sodium (VOLTAREN) 1 % Gel Apply 2 g topically 4 (four) times daily. for 10 days 100 g 0    losartan-hydrochlorothiazide 50-12.5 mg (HYZAAR) 50-12.5 mg per tablet       methocarbamol (ROBAXIN) 500 MG Tab Take 500 mg by mouth once daily.       No current facility-administered medications on file prior to visit.        Review of Systems  Review of Systems   Constitutional: Negative for fever.   HENT: Negative for ear discharge, ear pain, hearing loss (although describes hearing as muffled) and sinus pain.         Ear itching     Respiratory: Negative for cough.    Gastrointestinal: Negative for heartburn.   Skin:        No significant worsening of dandruff   Neurological: Negative for headaches.          Physical Exam   Vitals:    07/20/20 0750   BP: 124/82   Temp: 98.3 °F (36.8 °C)    Body mass index is 44.91 kg/m².          GENERAL: alert, no acute distress.  HEAD: normocephalic.   SKIN: no lesions of skin of head and neck area.  EYES: lids and lashes normal.   EARS: external ear without lesion, normal pinna shape and position. Right  External auditory canal with normal cerumen, tympanic membrane fully visible, no perforation , no retraction. No middle ear effusion. Ossicles intact. Left tm with mild retraction , no middle ear effusion  NOSE: external nose without abnormality. Turbinates boggy with edema on anterior rhinoscopy ( left more than right)  RESPIRATORY: no stridor, no stertor. Voice normal. Respirations nonlabored.  NEURO: alert, responds to questions appropriately.   PSYCH:mood appropriate    Assessment  1. Left ear pain  2. Hearing loss, unspecified hearing loss type, unspecified laterality  3. Dysfunction of both eustachian tubes        Plan:  Eustachian tube dysfunction (ETD):   Given lack of improvement of fullness sensation with treatment of chronic OE and presence of postnasal drip, will trial flonase and astelin for tx of possible ETD . Counseled pt to use saline before medication sprays and on administration techniques. She does also have significant nasal congestion on exam therefore suspect this may be component of her symptom etiology.    The patient needs evaluation with nasal scope given symptoms discussed at today's visit that were not known prior to the appointment. I am unable to perform this portion of the exam today due to [facility] policy of requiring a negative covid19 test 48-72 hours prior to any scope exam. I discussed this with the patient. I will set up the patient  for a follow up appointment and give information about when/where to go for test.Patient instructed not to get test sooner than 48-72 hours prior as I will not be able to use that test. Patient instructed to go to locations given to allow for correct turnover time for processing the test.    Needs audio evaluation for muffled hearing and aural fullness/discomfort     rtc with audio same day for scope and eval of response to meds

## 2020-07-20 NOTE — PATIENT INSTRUCTIONS
"Information and instructions from your visit with me today:    · Start using the following medication nasal sprays:   · Fluticasone spray:    This medication is a steroid spray. It stays within the nose and does not have absorption into the body that leads to side effects that one has with oral steroid medication. Fluticasone nasal spray is the same as the Flonase brand nasal spray. Discuss with your pharmacist if the price is lower over the counter or with a prescription ( this varies depending on insurance). The medication that is over the counter is the same as the prescription medication. Use this medication as instructed on the prescription, 2 sprays on each side of your nose twice daily.     · Azelastine  spray:  This medication is an antihistamine used to treat nasal symptoms of allergy, which works specifically in the nose unlike antihistamine pills which have more of an effect on the whole body. Use this medication as instructed on the prescription, 1 spray on each side of your nose twice daily.     Additional instructions for medication sprays  Place the tip of the medication bottle in your nose and aim slightly up and out on each side to get medication high and deep into your nose and sinuses, and not have it all deposit in the very front of your nose. Aim the tip of the nozzle towards the outer corner of your eye . You can imagine aiming towards the back of your eyeball on each side for this, as opposed to straight back to the center of your nose and head.     You need to use this medication every day regardless of symptoms, as it takes time ( a few weeks) to work and get the benefits. It does not work on an "as needed" basis like taking a decongestant. If your symptoms only occur in a particular season, then the medication can be used seasonally instead of year long. For seasonal symptoms, you should start using the spray twice daily a month before when you normally have symptoms ( for example, if " symptoms start in August, should start at the end of June).     · Start nasal irrigations with saline solution:    SALINE SINUS RINSE ([employer] brand): You should do a full bottle, half on one side of your nose and half on the other, 1-2 times per day (or more if able to, you cannot do this too much). Follow the instructions on the box: mix the salt packet with clean water (bottle, previously boiled, distilled, etc -- not tap water) to the line on the bottle to make the irrigation.     NASAL SALINE SPRAY ( simply saline) There are several different brands found in the cold and flu aisle of the pharmacy. You can also use simply saline or other brand of saline spray that delivers saline by gentle mist if you have difficulty or discomfort with neilmed rinse. Always rinse your nose with saline prior to using medication sprays and wait a couple of hours before using again.      · Do not use nasal decongestant sprays such as Afrin or similar products.  This can cause long term physical nasal addiction. Afrin should only be used if having nose bleeds (you can use this also if having significant fullness sensation in the ear) and should not be used for more than 2-3 days in a row . It is a not a medication that should be used for a long period of time.     It was nice meeting you today, and I look forward to helping you feel better soon. Please don't hesitate to call if you have any other questions or concerns, or if I can be of any assistance in the meantime.      Emilee Torrez MD    [facility]     Phone  [phone]    Fax      [phone]        Emilee Torrez MD  Otorhinolaryngology

## 2020-07-29 ENCOUNTER — PATIENT OUTREACH (OUTPATIENT)
Dept: ADMINISTRATIVE | Facility: HOSPITAL | Age: 50
End: 2020-07-29

## 2020-07-29 DIAGNOSIS — Z11.59 NEED FOR HEPATITIS C SCREENING TEST: ICD-10-CM

## 2020-07-29 DIAGNOSIS — Z12.31 ENCOUNTER FOR SCREENING MAMMOGRAM FOR MALIGNANT NEOPLASM OF BREAST: Primary | ICD-10-CM

## 2020-07-29 NOTE — PROGRESS NOTES
Formerly [facility] Hypertension report - the patient is no longer seeing Dr. Cruz, She has established care with Dr. Mekhi Isaacs. Dr. Quinn will be removed as the primary care from the patient's profile.

## 2020-08-17 ENCOUNTER — LAB VISIT (OUTPATIENT)
Dept: FAMILY MEDICINE | Facility: CLINIC | Age: 50
End: 2020-08-17
Payer: COMMERCIAL

## 2020-08-17 DIAGNOSIS — H92.02 LEFT EAR PAIN: ICD-10-CM

## 2020-08-17 DIAGNOSIS — H91.90 HEARING LOSS, UNSPECIFIED HEARING LOSS TYPE, UNSPECIFIED LATERALITY: ICD-10-CM

## 2020-08-17 PROCEDURE — U0003 INFECTIOUS AGENT DETECTION BY NUCLEIC ACID (DNA OR RNA); SEVERE ACUTE RESPIRATORY SYNDROME CORONAVIRUS 2 (SARS-COV-2) (CORONAVIRUS DISEASE [COVID-19]), AMPLIFIED PROBE TECHNIQUE, MAKING USE OF HIGH THROUGHPUT TECHNOLOGIES AS DESCRIBED BY CMS-2020-01-R: HCPCS

## 2020-08-18 LAB — SARS-COV-2 RNA RESP QL NAA+PROBE: NOT DETECTED

## 2020-08-21 ENCOUNTER — OFFICE VISIT (OUTPATIENT)
Dept: OTOLARYNGOLOGY | Facility: CLINIC | Age: 50
End: 2020-08-21
Payer: COMMERCIAL

## 2020-08-21 ENCOUNTER — CLINICAL SUPPORT (OUTPATIENT)
Dept: AUDIOLOGY | Facility: CLINIC | Age: 50
End: 2020-08-21
Payer: COMMERCIAL

## 2020-08-21 VITALS
SYSTOLIC BLOOD PRESSURE: 124 MMHG | HEIGHT: 66 IN | TEMPERATURE: 97 F | WEIGHT: 276.81 LBS | BODY MASS INDEX: 44.49 KG/M2 | DIASTOLIC BLOOD PRESSURE: 76 MMHG

## 2020-08-21 DIAGNOSIS — J39.2 NASOPHARYNGEAL MASS: ICD-10-CM

## 2020-08-21 DIAGNOSIS — J32.9 RECURRENT SINUSITIS: Primary | ICD-10-CM

## 2020-08-21 DIAGNOSIS — H90.A32 MIXED CONDUCTIVE AND SENSORINEURAL HEARING LOSS OF LEFT EAR WITH RESTRICTED HEARING OF RIGHT EAR: Primary | ICD-10-CM

## 2020-08-21 PROCEDURE — 92557 PR COMPREHENSIVE HEARING TEST: ICD-10-PCS | Mod: S$GLB,,, | Performed by: AUDIOLOGIST

## 2020-08-21 PROCEDURE — 31575 PR LARYNGOSCOPY, FLEXIBLE; DIAGNOSTIC: ICD-10-PCS | Mod: S$GLB,,, | Performed by: OTOLARYNGOLOGY

## 2020-08-21 PROCEDURE — 92504 PR EAR MICROSCOPY EXAMINATION: ICD-10-PCS | Mod: S$GLB,,, | Performed by: OTOLARYNGOLOGY

## 2020-08-21 PROCEDURE — 92557 COMPREHENSIVE HEARING TEST: CPT | Mod: S$GLB,,, | Performed by: AUDIOLOGIST

## 2020-08-21 PROCEDURE — 92550 TYMPANOMETRY & REFLEX THRESH: CPT | Mod: S$GLB,,, | Performed by: AUDIOLOGIST

## 2020-08-21 PROCEDURE — 92504 EAR MICROSCOPY EXAMINATION: CPT | Mod: S$GLB,,, | Performed by: OTOLARYNGOLOGY

## 2020-08-21 PROCEDURE — 31575 DIAGNOSTIC LARYNGOSCOPY: CPT | Mod: S$GLB,,, | Performed by: OTOLARYNGOLOGY

## 2020-08-21 PROCEDURE — 92550 PR TYMPANOMETRY AND REFLEX THRESHOLD MEASUREMENTS: ICD-10-PCS | Mod: S$GLB,,, | Performed by: AUDIOLOGIST

## 2020-08-21 PROCEDURE — 3078F DIAST BP <80 MM HG: CPT | Mod: CPTII,S$GLB,, | Performed by: OTOLARYNGOLOGY

## 2020-08-21 PROCEDURE — 3078F PR MOST RECENT DIASTOLIC BLOOD PRESSURE < 80 MM HG: ICD-10-PCS | Mod: CPTII,S$GLB,, | Performed by: OTOLARYNGOLOGY

## 2020-08-21 PROCEDURE — 3008F BODY MASS INDEX DOCD: CPT | Mod: CPTII,S$GLB,, | Performed by: OTOLARYNGOLOGY

## 2020-08-21 PROCEDURE — 99215 PR OFFICE/OUTPT VISIT, EST, LEVL V, 40-54 MIN: ICD-10-PCS | Mod: 25,S$GLB,, | Performed by: OTOLARYNGOLOGY

## 2020-08-21 PROCEDURE — 3074F PR MOST RECENT SYSTOLIC BLOOD PRESSURE < 130 MM HG: ICD-10-PCS | Mod: CPTII,S$GLB,, | Performed by: OTOLARYNGOLOGY

## 2020-08-21 PROCEDURE — 3074F SYST BP LT 130 MM HG: CPT | Mod: CPTII,S$GLB,, | Performed by: OTOLARYNGOLOGY

## 2020-08-21 PROCEDURE — 3008F PR BODY MASS INDEX (BMI) DOCUMENTED: ICD-10-PCS | Mod: CPTII,S$GLB,, | Performed by: OTOLARYNGOLOGY

## 2020-08-21 PROCEDURE — 99215 OFFICE O/P EST HI 40 MIN: CPT | Mod: 25,S$GLB,, | Performed by: OTOLARYNGOLOGY

## 2020-08-21 RX ORDER — PHENTERMINE HYDROCHLORIDE 37.5 MG/1
TABLET ORAL
COMMUNITY
Start: 2020-08-10

## 2020-08-21 NOTE — PROGRESS NOTES
Click on link to view Audiogram  Document on 8/21/2020  2:09 PM by Leslie Marcos MA CCC-A: Audiogram     Steph Sterling was seen today for an audiologic evaluation for hearing loss in the left ear.  She reported that her left ear feels clogged and her hearing is muffled.    Tympanometry revealed a Type B tympanogram with a 1.1 ear canal volume for the left ear and a Type A tympanogram with a 1.5 ear canal volume for the right ear.  Audiogram results revealed a mild sensorineural hearing loss at 1000Hz only for the right ear and a mild mixed hearing loss for the left ear.  Speech audiometry revealed a speech reception threshold at 15dBHL with a word recognition score of 100% for the right ear and a speech reception threshold at 25dBHL with a word recognition score of 96% for the left ear.    Recommendations:  1. Otologic evaluation  2. Annual hearing test  3. Consider hearing aid

## 2020-08-21 NOTE — PROGRESS NOTES
Otolaryngology Clinic Note  Date:  08/21/2020     Chief complaint:  Chief Complaint   Patient presents with    Hearing Loss     ETD       History of Present Illness  Steph Sterling is a 50 y.o. female  presenting today for a follow up of ear pain and muffled hearing. She has been using the nasal sprays as prescribed and notes that they have beenhelping.   Her ears started itching again last week - she used the cream which has helped but she still is having some itching and full sensation. Occasionally feeling fluid in left ear and it feels feels muffled . Sometimes right ear may be clogged also. She has not noticed any leakage from the ear since starting the drops.    She was initially seen on 5/7/20 with cerumen impaction that was removed. I had her follow up with me on 5/13/20 as her ear looked like it was possibly developing and infection . I started her on lotrisone cream for the ear.   When she initially presented to me on 5-7-20 she noted  symptoms started in February . At that time, she had her ear flushed which helped but then symptoms returned around east and did not resolve with debrox. At that time she had static noise in her ear and muffled hearing with ear itching and ear discomfort.      Historically, she only has problems with the left ear, never the right.     Review of systems to evaluate for risk factors such as LPR or allergic rhinitis that could cause eustachian tube dysfunction were postnasal drip.  no itching of eyes/nose and no nasal congestion nor rhinorrhea to suggest seasonal allergies. No globus, does sometimes get throat clearing at night.      risk factors for chronic otitis externa includes  a history of scalp dandruff -uses head and shoulders, alternates with neutrogena     Past Medical History  Diagnosis Date    Achilles tendinitis     Cataract     HTN (hypertension) 12/26/2012    Migraine with acute onset aura 12/26/2012    Obesity     Ovarian cyst         Past Surgical  "History  Procedure Laterality Date    ANKLE SURGERYachilles tendon repair Right 11-4-15    SALPINGOOPHORECTOMY  11/15/2017    VAGINAL DELIVERY          Medications  Current Outpatient Medications on File Prior to Visit   Medication Sig Dispense Refill    azelastine (ASTELIN) 137 mcg (0.1 %) nasal spray 1 spray (137 mcg total) by Nasal route 2 (two) times daily. 30 mL 3    clotrimazole-betamethasone 1-0.05% (LOTRISONE) cream Apply topically 2 (two) times daily. Apply small amount to ear canal 1 Tube 0    fluticasone propionate (FLONASE) 50 mcg/actuation nasal spray 1 spray (50 mcg total) by Each Nostril route 2 (two) times daily. 18.2 mL 3    losartan-hydrochlorothiazide 50-12.5 mg (HYZAAR) 50-12.5 mg per tablet       methocarbamol (ROBAXIN) 500 MG Tab Take 500 mg by mouth once daily.      phentermine (ADIPEX-P) 37.5 mg tablet TK 1 T PO QD      diclofenac sodium (VOLTAREN) 1 % Gel Apply 2 g topically 4 (four) times daily. for 10 days 100 g 0     No current facility-administered medications on file prior to visit.        Review of Systems  Review of Systems   Constitutional: Negative for fever.   HENT: Negative for ear discharge, ear pain, hearing loss (although describes hearing as muffled) and sinus pain.         Ear itching     Respiratory: Negative for cough.    Gastrointestinal: Negative for heartburn.   Skin:        No significant worsening of dandruff   Neurological: Negative for headaches.          Physical Exam   Vitals:    08/21/20 1347   BP: 124/76   Temp: 96.7 °F (35.9 °C)    Body mass index is 44.67 kg/m².  Weight: 125.5 kg (276 lb 12.6 oz)   Height: 5' 6" (167.6 cm)   GENERAL:no acute distress.  HEAD: normocephalic.   SKIN: no lesions of skin of head and neck area.  EYES: lids and lashes normal. Pupils equal  EARS: external ear without lesion, normal pinna shape and position. Right  External auditory canal with normal cerumen, tympanic membrane fully visible, no perforation , no retraction. No " middle ear effusion. Ossicles intact. See micro note for ear exam   NOSE: external nose without abnormality. Turbinates boggy with edema on anterior rhinoscopy ( left more than right)  NECK: trachea midline.   RESPIRATORY: no stridor, no stertor. Voice normal. Respirations nonlabored.  NEURO: alert, responds to questions appropriately.   PSYCH:mood appropriate    Procedure performed: microscopic examination of ears with debridement    Indication for procedure: left middle ear effusion, conductive hearing loss     Description of procedure:  After verbal consent was obtained, the patient was positioned in semi recumbent position and speculum was placed in the left ear and microscope brought into the field.  The microscope was positioned and magnification adjusted for appropriate visualization.  Otologic instruments including various size otologic suctions were then used to debride  material from the external auditory canal under visualization with the operating microscope. After cleaning, visualization was again performed and at various levels of higher magnification to optimize views of the ear canal, tympanic membrane, ossicles and middle ear space. Findings as indicated below. All portions of the procedure and examination by otomicroscopy were tolerated well without complication.     Findings:   Left tm with mild retraction , mucoid middle ear effusion on left   crusting in left eac that looked like more like dried thick pus, no active purulence however. Hard crusting which was difficult to remove completely. Able to remove 25% or so and see more of the TM . No obvious perforation . Appearance does not appear similar to hardened wax and looks more like hardened mucoid drainage seen in chronic myringitis or when granulation tissue is present although this not seen today       PROCEDURE NOTE  NAME OF PROCEDURE: Flexible Laryngoscopy, diagnostic  INDICATIONS:unilateral otalgia  FINDINGS: adenoid  hypertrophy    Consent: After procedure was explained in detail and all questions answered, verbal consent was obtained for performing flexible laryngoscopy.  Anesthesia: topical 4% lidocaine and neosynephrine  Procedure: With patient in seated position, the scope was inserted into the  nasal passageway and advanced atraumatically into the nasopharynx to examine the following structures  Nasopharynx: adenoid hypertrophy-mild vs np mass central- possible thornwaldt cyst  Oropharynx: base of tongue without  mass or ulceration. Lingual tonsils with symmetric hypertrophy, no mass  Hypopharynx: posterior pharyngeal wall without mass or lesion. No pooling of secretions. Pyriform sinuses visible without mass or lesion  Larynx: epiglottis normal without lesion. False vocal folds without edema/erythema/lesion. True vocal folds mobile and without lesion. No interarytenoid edema nor erythema . Postcricoid region without edema or lesion  Subglottis: visualized portion of subglottis normal in appearance    After examination performed, the scope was removed atraumatically . The patient tolerated the procedure well.Photodocumentation obtained with representative images below, all images uploaded in media section of epic.  Adenoid hypertrophy in nasopharynx-not obstructing eustachian tube orifices                AUDIOLOGY RESULTS  Audiometric evaluation including audiogram, tympanometry, acoustic reflexes, and speech discrimination which was performed 8-21-20 was personally reviewed and interpreted.  Notable findings on the audiogram were normal right hearing with mild sensorineural hearing loss (SNHL)at 1 Khz only. The left ear .  Tympanometry revealed Type B tympanogram on the left with normal ECV and Type As tympanogram on the right. Speech discrimination was 96%  on the left, and 100% on the right.         Report of the audiologist performing this audiometric testing was also reviewed       Assessment  1. Left middle ear  effusion   2. Hearing loss, mixed left  3. Dysfunction of both eustachian tubes  4. Chronic otitis externa     Plan:  NP mass vs adenoid hypertrophy vs thornwaldt cyst: slight waldeyer's ring hypertrrophy ( lingual tonsils and Np tissue) -CBC with diff to rule out lymphoma/leukemic process. Will also check HIV which can lead to adenoid hypertrophy. Ct sinus scan to eval for shronic sinus disease and for any characteristics of malignant features of np mass.     Eustachian tube dysfunction (ETD) : ETD can be idiopathic or caused by allergic rhinitis (AR ) or laryngopharyngo reflux (LPR). She does have adenoid h ypertrophy appearance although could be thornwaldt cyst that intermittently fills with fluid and obstructs ET. Does not appear to be obstructing ET orifice now. Will get ct scan sinuses ( see above). Sometimes this can lead to development of a middle ear effusion (OSWALDO) which may or may not get secondarily infected. Usually the fluid will resolve without intervention however in some cases it can take 8-12 weeks for fluid to resolve completely. Occasionally a tympanostomy tube (PET) needs to be placed for this ETD treatment in certain conditions (persistent OSWALDO >2-3 months or if severe pain associated with or without OSWALDO, significant retraction of tympanic membrane that appears to be starting to cause conductive hearing changes). In addition to treatment trials for either AR or LPR, the patient can gently autoinsufflate daily to intermittently equalize middle ear pressure . If unsuccessful, pt should not  continue with more frequent or more forceful attempts with this maneuver. Intermittent use of  afrin can also help however I advise to only use if having  severe pain given risk of rhinitis medicamentosa (pt counseled extensively about  risk of physical addiction and not to use for prolonged time period).    Continue current nasal spray regimen     Chronic OE vs acute on chronic OE: dombero tx in peroxide-  unclear if this is primarily from eac, did not appreciate perf but debris in eac difficulty to remove and not consistent with hardened wax. Will repeat exam after tx with dombero rinses ( powder and peroxide given to pt)    F/u 2-3 weeks with ct scan . Will also eval temporal bone on scan to look for any evidence of chronic OME in addition to eval of NP mass

## 2020-08-21 NOTE — PATIENT INSTRUCTIONS
Put 1 packet of dombero in 4 fluid ounces of peroxide and fill up dropper bottle with mixture and rinse left ear.   If you get a 16 fluid ounce bottle, use 2 packets of dombero. Only left ear

## 2020-08-21 NOTE — LETTER
August 21, 2020      [facility]  [address]   [place] [address]  Phone: [phone]       Patient: Steph Sterlign   YOB: 1970  Date of Visit: 08/21/2020    To Whom It May Concern:    Fransisco Sterling  was at [facility] on 08/21/2020. She may return to work/school on 8/21/2020 restrictions. If you have any questions or concerns, or if I can be of further assistance, please do not hesitate to contact me.    Sincerely,    GINETTE Galeano MD

## 2020-08-25 ENCOUNTER — HOSPITAL ENCOUNTER (OUTPATIENT)
Dept: RADIOLOGY | Facility: HOSPITAL | Age: 50
Discharge: HOME | End: 2020-08-25
Attending: OTOLARYNGOLOGY
Payer: COMMERCIAL

## 2020-08-25 DIAGNOSIS — J39.2 NASOPHARYNGEAL MASS: ICD-10-CM

## 2020-08-25 DIAGNOSIS — J32.9 RECURRENT SINUSITIS: ICD-10-CM

## 2020-08-25 PROCEDURE — 70486 CT MAXILLOFACIAL W/O DYE: CPT | Mod: TC

## 2020-08-25 PROCEDURE — 70486 CT MAXILLOFACIAL W/O DYE: CPT | Mod: 26,,, | Performed by: RADIOLOGY

## 2020-08-25 PROCEDURE — 70486 CT MEDTRONIC SINUSES WITHOUT: ICD-10-PCS | Mod: 26,,, | Performed by: RADIOLOGY

## 2020-09-11 ENCOUNTER — OFFICE VISIT (OUTPATIENT)
Dept: OTOLARYNGOLOGY | Facility: CLINIC | Age: 50
End: 2020-09-11
Payer: COMMERCIAL

## 2020-09-11 VITALS
WEIGHT: 276 LBS | TEMPERATURE: 99 F | SYSTOLIC BLOOD PRESSURE: 140 MMHG | BODY MASS INDEX: 44.36 KG/M2 | DIASTOLIC BLOOD PRESSURE: 80 MMHG | HEIGHT: 66 IN

## 2020-09-11 DIAGNOSIS — H60.8X3 CHRONIC ECZEMATOUS OTITIS EXTERNA OF BOTH EARS: ICD-10-CM

## 2020-09-11 DIAGNOSIS — J35.2 ADENOID HYPERTROPHY: Primary | ICD-10-CM

## 2020-09-11 PROCEDURE — 99213 PR OFFICE/OUTPT VISIT, EST, LEVL III, 20-29 MIN: ICD-10-PCS | Mod: S$GLB,,, | Performed by: OTOLARYNGOLOGY

## 2020-09-11 PROCEDURE — 3077F PR MOST RECENT SYSTOLIC BLOOD PRESSURE >= 140 MM HG: ICD-10-PCS | Mod: CPTII,S$GLB,, | Performed by: OTOLARYNGOLOGY

## 2020-09-11 PROCEDURE — 3008F PR BODY MASS INDEX (BMI) DOCUMENTED: ICD-10-PCS | Mod: CPTII,S$GLB,, | Performed by: OTOLARYNGOLOGY

## 2020-09-11 PROCEDURE — 3079F PR MOST RECENT DIASTOLIC BLOOD PRESSURE 80-89 MM HG: ICD-10-PCS | Mod: CPTII,S$GLB,, | Performed by: OTOLARYNGOLOGY

## 2020-09-11 PROCEDURE — 1125F PR PAIN SEVERITY QUANTIFIED, PAIN PRESENT: ICD-10-PCS | Mod: S$GLB,,, | Performed by: OTOLARYNGOLOGY

## 2020-09-11 PROCEDURE — 1125F AMNT PAIN NOTED PAIN PRSNT: CPT | Mod: S$GLB,,, | Performed by: OTOLARYNGOLOGY

## 2020-09-11 PROCEDURE — 3008F BODY MASS INDEX DOCD: CPT | Mod: CPTII,S$GLB,, | Performed by: OTOLARYNGOLOGY

## 2020-09-11 PROCEDURE — 92504 EAR MICROSCOPY EXAMINATION: CPT | Mod: S$GLB,,, | Performed by: OTOLARYNGOLOGY

## 2020-09-11 PROCEDURE — 3079F DIAST BP 80-89 MM HG: CPT | Mod: CPTII,S$GLB,, | Performed by: OTOLARYNGOLOGY

## 2020-09-11 PROCEDURE — 92504 PR EAR MICROSCOPY EXAMINATION: ICD-10-PCS | Mod: S$GLB,,, | Performed by: OTOLARYNGOLOGY

## 2020-09-11 PROCEDURE — 99213 OFFICE O/P EST LOW 20 MIN: CPT | Mod: S$GLB,,, | Performed by: OTOLARYNGOLOGY

## 2020-09-11 PROCEDURE — 3077F SYST BP >= 140 MM HG: CPT | Mod: CPTII,S$GLB,, | Performed by: OTOLARYNGOLOGY

## 2020-09-11 NOTE — PROGRESS NOTES
Otolaryngology Clinic Note  Date:  09/11/2020     Chief complaint:  Chief Complaint   Patient presents with    Follow-up     Left Ear       History of Present Illness  Steph Sterling is a 50 y.o. female  presenting today for a follow up of ear pain and muffled hearing.      at last visit on 8-21-20 she noted that  the nasal sprays as prescribed and noted that they had been helping.   She was still having itching and muffled sensation in the ear .  Occasionally feeling fluid in left ear and it feels feels muffled . Sometimes right ear may be clogged also. She had some thick dried /hard skin debris and crusting in the ear at last visit that was concerning for possible infection so I started her on dombero and peroxide . She initially noted some improvement but over the latter part of the course of treatment symptoms worsened.     At visit on 8-21-20 flexible laryngoscopy showed findings of adenoid hypertrophy . Ct scan of the sinuses has been obtained since that time and she is here for review of that and to recheck her ears.     She was initially seen on 5/7/20 with cerumen impaction that was removed. I had her follow up with me on 5/13/20 as her ear looked like it was possibly developing and infection . I started her on lotrisone cream for the ear.   When she initially presented to me on 5-7-20 she noted  symptoms started in February . At that time, she had her ear flushed which helped but then symptoms returned around easter and did not resolve with debrox. At that time she had static noise in her ear and muffled hearing with ear itching and ear discomfort.      Historically, she only has problems with the left ear, never the right.     Review of systems to evaluate for risk factors such as LPR or allergic rhinitis that could cause eustachian tube dysfunction were postnasal drip.  no itching of eyes/nose and no nasal congestion nor rhinorrhea to suggest seasonal allergies. No globus, does sometimes get throat  clearing at night.      risk factors for chronic otitis externa includes  a history of scalp dandruff -uses head and shoulders, alternates with neutrogena     Past Medical History  Diagnosis Date    Achilles tendinitis     Cataract     HTN (hypertension) 12/26/2012    Migraine with acute onset aura 12/26/2012    Obesity     Ovarian cyst         Past Surgical History  Procedure Laterality Date    ANKLE SURGERYachilles tendon repair Right 11-4-15    SALPINGOOPHORECTOMY  11/15/2017    VAGINAL DELIVERY          Medications  Current Outpatient Medications on File Prior to Visit   Medication Sig Dispense Refill    azelastine (ASTELIN) 137 mcg (0.1 %) nasal spray 1 spray (137 mcg total) by Nasal route 2 (two) times daily. 30 mL 3    fluticasone propionate (FLONASE) 50 mcg/actuation nasal spray 1 spray (50 mcg total) by Each Nostril route 2 (two) times daily. 18.2 mL 3    losartan-hydrochlorothiazide 50-12.5 mg (HYZAAR) 50-12.5 mg per tablet       methocarbamol (ROBAXIN) 500 MG Tab Take 500 mg by mouth once daily.      phentermine (ADIPEX-P) 37.5 mg tablet TK 1 T PO QD      clotrimazole-betamethasone 1-0.05% (LOTRISONE) cream Apply topically 2 (two) times daily. Apply small amount to ear canal (Patient not taking: Reported on 9/11/2020) 1 Tube 0    diclofenac sodium (VOLTAREN) 1 % Gel Apply 2 g topically 4 (four) times daily. for 10 days 100 g 0     No current facility-administered medications on file prior to visit.        Review of Systems  Review of Systems   Constitutional: Negative for fever.   HENT: Negative for ear discharge, ear pain, hearing loss (although describes hearing as muffled) and sinus pain.         Ear itching     Respiratory: Negative for cough.    Gastrointestinal: Negative for heartburn.   Skin:        No significant worsening of dandruff   Neurological: Negative for headaches.          Physical Exam   Vitals:    09/11/20 1520   BP: (!) 140/80   Temp: 98.5 °F (36.9 °C)    Body mass  "index is 44.55 kg/m².  Weight: 125.2 kg (276 lb)   Height: 5' 6" (167.6 cm)   GENERAL:no acute distress.  HEAD: normocephalic.   SKIN: no lesions of skin of head and neck area.  EYES: lids and lashes normal. Pupils equal  EARS: external ear without lesion, normal pinna shape and position. Right  External auditory canal with normal cerumen, tympanic membrane fully visible, no perforation , no retraction. No middle ear effusion. Ossicles intact. See micro note for ear exam   NOSE: external nose without abnormality. Turbinates boggy with edema on anterior rhinoscopy ( left more than right)  NECK: trachea midline.   RESPIRATORY: no stridor, no stertor. Voice normal. Respirations nonlabored.  NEURO: alert, responds to questions appropriately.   PSYCH:mood appropriate    Procedure performed: microscopic examination of ears with debridement    Indication for procedure: left middle ear effusion, conductive hearing loss     Description of procedure:  After verbal consent was obtained, the patient was positioned in semi recumbent position and speculum was placed in the left ear and microscope brought into the field.  The microscope was positioned and magnification adjusted for appropriate visualization.  Otologic instruments including various size otologic suctions were then used to debride  material from the external auditory canal under visualization with the operating microscope. After cleaning, visualization was again performed and at various levels of higher magnification to optimize views of the ear canal, tympanic membrane, ossicles and middle ear space. Findings as indicated below. All portions of the procedure and examination by otomicroscopy were tolerated well without complication.     Findings:   no middle ear effusion, thick  crusting in left eac that looked like more like dried thick pus, no active purulence however. Hard crusting which was difficult to remove completely. A large thick crust was finally removed " and I was able to see the TM and middle ear space.  No obvious perforation . Appearance does not appear similar to hardened wax and seems unusual for continued build up so quickly.  No granulation tissue noted.     IMAGING:  Ct sinus: patent OMC, no sinus pathology. NP mass/adenoid hypertrophy noted, nonobstructive, no skull base destruction . Radiology noted incidental empty sella , no obvious mastoid effusion or erosion , debris in left eac.     Assessment  1. Left middle ear effusion-resolved   2. Hearing loss, mixed left  3. Dysfunction of both eustachian tubes  4. Chronic otitis externa  5. Adenoid hypertrophy     Plan:  Adenoid hypertrophy: she did not have covid testing . I reviewed her CT scan with her. Would like to see her back for repeat scope and if adenoid tissue still present would recommend biopsy . It is slightly adjacent to eustachian tube orifice so may be contributing to some of her pathology .     Crusting and hard skin in ear/ ?cholesteatoma: continues to have build up of debris in ear  That is hard and flaky and is building up more quickly than what I would expect for cerumen. I will have her see dr. La for second opinion. I did not see any obvious pathology on ct of temporal bone area other than the eac being blocked by what I removed today.

## 2020-09-18 ENCOUNTER — LAB VISIT (OUTPATIENT)
Dept: FAMILY MEDICINE | Facility: CLINIC | Age: 50
End: 2020-09-18
Payer: COMMERCIAL

## 2020-09-18 DIAGNOSIS — J35.2 ADENOID HYPERTROPHY: ICD-10-CM

## 2020-09-18 PROCEDURE — U0003 INFECTIOUS AGENT DETECTION BY NUCLEIC ACID (DNA OR RNA); SEVERE ACUTE RESPIRATORY SYNDROME CORONAVIRUS 2 (SARS-COV-2) (CORONAVIRUS DISEASE [COVID-19]), AMPLIFIED PROBE TECHNIQUE, MAKING USE OF HIGH THROUGHPUT TECHNOLOGIES AS DESCRIBED BY CMS-2020-01-R: HCPCS

## 2020-09-19 LAB — SARS-COV-2 RNA RESP QL NAA+PROBE: NOT DETECTED

## 2020-09-21 ENCOUNTER — OFFICE VISIT (OUTPATIENT)
Dept: OTOLARYNGOLOGY | Facility: CLINIC | Age: 50
End: 2020-09-21
Payer: COMMERCIAL

## 2020-09-21 VITALS
TEMPERATURE: 98 F | SYSTOLIC BLOOD PRESSURE: 120 MMHG | HEIGHT: 66 IN | WEIGHT: 280 LBS | DIASTOLIC BLOOD PRESSURE: 90 MMHG | BODY MASS INDEX: 45 KG/M2

## 2020-09-21 DIAGNOSIS — H69.93 DYSFUNCTION OF BOTH EUSTACHIAN TUBES: ICD-10-CM

## 2020-09-21 DIAGNOSIS — K21.9 LARYNGOPHARYNGEAL REFLUX (LPR): ICD-10-CM

## 2020-09-21 DIAGNOSIS — H60.8X3 CHRONIC ECZEMATOUS OTITIS EXTERNA OF BOTH EARS: ICD-10-CM

## 2020-09-21 DIAGNOSIS — J35.2 ADENOID HYPERTROPHY: Primary | ICD-10-CM

## 2020-09-21 PROCEDURE — 99214 OFFICE O/P EST MOD 30 MIN: CPT | Mod: 25,S$GLB,, | Performed by: OTOLARYNGOLOGY

## 2020-09-21 PROCEDURE — 3080F PR MOST RECENT DIASTOLIC BLOOD PRESSURE >= 90 MM HG: ICD-10-PCS | Mod: CPTII,S$GLB,, | Performed by: OTOLARYNGOLOGY

## 2020-09-21 PROCEDURE — 31575 PR LARYNGOSCOPY, FLEXIBLE; DIAGNOSTIC: ICD-10-PCS | Mod: S$GLB,,, | Performed by: OTOLARYNGOLOGY

## 2020-09-21 PROCEDURE — 3008F BODY MASS INDEX DOCD: CPT | Mod: CPTII,S$GLB,, | Performed by: OTOLARYNGOLOGY

## 2020-09-21 PROCEDURE — 3080F DIAST BP >= 90 MM HG: CPT | Mod: CPTII,S$GLB,, | Performed by: OTOLARYNGOLOGY

## 2020-09-21 PROCEDURE — 3074F SYST BP LT 130 MM HG: CPT | Mod: CPTII,S$GLB,, | Performed by: OTOLARYNGOLOGY

## 2020-09-21 PROCEDURE — 31575 DIAGNOSTIC LARYNGOSCOPY: CPT | Mod: S$GLB,,, | Performed by: OTOLARYNGOLOGY

## 2020-09-21 PROCEDURE — 3074F PR MOST RECENT SYSTOLIC BLOOD PRESSURE < 130 MM HG: ICD-10-PCS | Mod: CPTII,S$GLB,, | Performed by: OTOLARYNGOLOGY

## 2020-09-21 PROCEDURE — 99214 PR OFFICE/OUTPT VISIT, EST, LEVL IV, 30-39 MIN: ICD-10-PCS | Mod: 25,S$GLB,, | Performed by: OTOLARYNGOLOGY

## 2020-09-21 PROCEDURE — 3008F PR BODY MASS INDEX (BMI) DOCUMENTED: ICD-10-PCS | Mod: CPTII,S$GLB,, | Performed by: OTOLARYNGOLOGY

## 2020-09-21 RX ORDER — PANTOPRAZOLE SODIUM 40 MG/1
40 TABLET, DELAYED RELEASE ORAL DAILY
Qty: 30 TABLET | Refills: 3 | Status: SHIPPED | OUTPATIENT
Start: 2020-09-21 | End: 2020-10-21

## 2020-09-21 NOTE — PROGRESS NOTES
Otolaryngology Clinic Note  Date:  09/21/2020     Chief complaint:  Chief Complaint   Patient presents with    Other     Hyp Adenoids       History of Present Illness  Steph Sterling is a 50 y.o. female  presenting today for a follow up of ear pain and muffled hearing.    I saw her 9-11-20 and ear had filled up again completely with dried crust/skin debris . She is seeing Dr. La tomorrow. She is having a bit of itching and feels material may be building up again.     She did not have a covid test prior to last appointment , she is here today for repeat scope exam and had covid test prior which is negative. No other changes since last visit.     at visit on 8-21-20 she noted that  the nasal sprays as prescribed and noted that they had been helping.   She was still having itching and muffled sensation in the ear .  Occasionally feeling fluid in left ear and it feels feels muffled . Sometimes right ear may be clogged also. She had some thick dried /hard skin debris and crusting in the ear at last visit that was concerning for possible infection so I started her on dombero and peroxide . She initially noted some improvement but over the latter part of the course of treatment symptoms worsened.     At visit on 8-21-20 flexible laryngoscopy showed findings of adenoid hypertrophy . Ct scan of the sinuses has been obtained since that time and she is here for review of that and to recheck her ears.     She was initially seen on 5/7/20 with cerumen impaction that was removed. I had her follow up with me on 5/13/20 as her ear looked like it was possibly developing and infection . I started her on lotrisone cream for the ear.   When she initially presented to me on 5-7-20 she noted  symptoms started in February . At that time, she had her ear flushed which helped but then symptoms returned around easter and did not resolve with debrox. At that time she had static noise in her ear and muffled hearing with ear itching and  ear discomfort.      Historically, she only has problems with the left ear, never the right.     Review of systems to evaluate for risk factors such as LPR or allergic rhinitis that could cause eustachian tube dysfunction were postnasal drip.  no itching of eyes/nose and no nasal congestion nor rhinorrhea to suggest seasonal allergies. No globus, does sometimes get throat clearing at night.      risk factors for chronic otitis externa includes  a history of scalp dandruff -uses head and shoulders, alternates with neutrogena     Past Medical History  Diagnosis Date    Achilles tendinitis     Cataract     HTN (hypertension) 12/26/2012    Migraine with acute onset aura 12/26/2012    Obesity     Ovarian cyst         Past Surgical History  Procedure Laterality Date    ANKLE SURGERYachilles tendon repair Right 11-4-15    SALPINGOOPHORECTOMY  11/15/2017    VAGINAL DELIVERY          Medications  Current Outpatient Medications on File Prior to Visit   Medication Sig Dispense Refill    azelastine (ASTELIN) 137 mcg (0.1 %) nasal spray 1 spray (137 mcg total) by Nasal route 2 (two) times daily. 30 mL 3    clotrimazole-betamethasone 1-0.05% (LOTRISONE) cream Apply topically 2 (two) times daily. Apply small amount to ear canal (Patient not taking: Reported on 9/11/2020) 1 Tube 0    diclofenac sodium (VOLTAREN) 1 % Gel Apply 2 g topically 4 (four) times daily. for 10 days 100 g 0    fluticasone propionate (FLONASE) 50 mcg/actuation nasal spray 1 spray (50 mcg total) by Each Nostril route 2 (two) times daily. 18.2 mL 3    losartan-hydrochlorothiazide 50-12.5 mg (HYZAAR) 50-12.5 mg per tablet       methocarbamol (ROBAXIN) 500 MG Tab Take 500 mg by mouth once daily.      phentermine (ADIPEX-P) 37.5 mg tablet TK 1 T PO QD       No current facility-administered medications on file prior to visit.        Review of Systems  Review of Systems   Constitutional: Negative for fever.   HENT: Negative for ear discharge, ear  pain, hearing loss (although describes hearing as muffled) and sinus pain.         Ear itching     Respiratory: Negative for cough.    Gastrointestinal: Negative for heartburn.   Skin:        No significant worsening of dandruff   Neurological: Negative for headaches.          Physical Exam   Vitals:    09/21/20 0825   BP: (!) 120/90   Temp: 98.4 °F (36.9 °C)    Body mass index is 45.19 kg/m².          GENERAL:no acute distress.  HEAD: normocephalic.   SKIN: no lesions of skin of head and neck area.  EYES: lids and lashes normal. Pupils equal  EARS: external ear without lesion, normal pinna shape and position. Right  External auditory canal with normal cerumen, tympanic membrane fully visible, no perforation , no retraction. No middle ear effusion. Ossicles intact. Left eac with slight crust inferior close to drum but not extreme buildup- similar to change in appearance from 5-2020 exam to 7-2020 exam.  NOSE: external nose without abnormality. Turbinates slightly boggy  on anterior rhinoscopy   NECK: trachea midline.   RESPIRATORY: no stridor, no stertor. Voice normal. Respirations nonlabored.  NEURO: alert, responds to questions appropriately.   PSYCH:mood appropriate    PROCEDURE NOTE  NAME OF PROCEDURE: Flexible Laryngoscopy, diagnostic  INDICATIONS: follow up adenoid  FINDINGS: LPR, adenoid hypertrophy    Consent: After procedure was explained in detail and all questions answered, verbal consent was obtained for performing flexible laryngoscopy.  Anesthesia: topical 4% lidocaine and neosynephrine  Procedure: With patient in seated position, the scope was inserted into the  nasal passageway and advanced atraumatically into the nasopharynx to examine the following structures  Nasopharynx:adenoid hypertrophy  Oropharynx: base of tongue without  mass or ulceration. Lingual tonsils normal in appearance  Hypopharynx: posterior pharyngeal wall without mass . Posterior pharyngeal cobblestoning and some narrowing of  pharynx  No pooling of secretions. Pyriform sinuses visible without mass or lesion  Larynx: epiglottis normal without lesion. False vocal folds without edema/erythema/lesion. True vocal folds mobile and without lesion. mild interarytenoid edema nor erythema . Postcricoid region with mild edema , no lesion  Subglottis: visualized portion of subglottis normal in appearance    After examination performed, the scope was removed atraumatically . The patient tolerated the procedure well.Photodocumentation obtained with representative images below, all images uploaded in media section of epic.              Assessment  1. Left middle ear effusion-resolved   2. Hearing loss, mixed left  3. Dysfunction of both eustachian tubes  4. Chronic otitis externa  5. Adenoid hypertrophy   6. LPR    Plan:  Adenoid hypertrophy: unchanged in size, does not appear to be blocking eustachian tube orifices  on exam - suspect imaging is artifactual that it could be blocking off that area. Has persistent edema/erythema ( although mild) of larynx and cobblestoning despite max therapy for tx of any possible allergic rhinitis. No sinusitis noted on ct scan. Will do a trial with ppi and repeat scope and discuss continued monitoring vs biopsy of NP. Likely reactive hyperplasia but biopsy would need to be done to confirm not lymphoma. Given no enlargement suspect this is less likely.     Laryngopharyngoreflux(LPR): reviewed behavioral modifications such as not eating several hours before sleep, foods/beverages that can trigger reflux, sleep with head of bed elevated, etc   - may need eval by speech therapy in the future. Some patients develop maladaptive laryngeal response to chronic gerd.   - PPI immediately upon awakening. Since  does not have esophageal symptoms , will start  once daily treatment. counseled can take up to 2 months to notice symptom  change. Discussed rirsk of osteoporosis ( quiana in females) with long term use. If incomplete  response, may need workup for hiatal hernia or obstructive sleep apnea, both can cause difficult to medically treat GERD. May also need referral to GI in future if has response to medication for further evaluation. If no response to medication after 2 month trial, would evaluate other etiologies . Given constellation of exam findings and symptoms and lack of change on scope exam despite tx with nasal sprays, suspect this is most likely cause at this time.     Crusting and hard skin in ear/ ?cholesteatoma: continues to have build up of debris in ear  That is hard and flaky and is building up more quickly than what I would expect for cerumen. I will have her see dr. La for second opinion. I did not see any obvious pathology on ct of temporal bone area other than the eac being blocked by what I removed 9-11-20. Not as much debris today as previously. Has persistent ear issues despite multiple therapies I have tried. Suspect some related to ETD . Likely no pathology but would like to get second opinion    F/u 6 weeks with scope exam

## 2020-09-21 NOTE — PATIENT INSTRUCTIONS
Information and instructions from your visit with me today:    · Diagnosis: Your symptoms are likely caused by laryngo-pharyngealreflux (LPR). This is similar to acid reflux or gastroesophageal reflux disease (GERD) , but instead of just having heartburn, your throat is being irritated due to stomach acid coming up all the way to the back of your voice box.    · Symptoms of LPR include sore throat, white phlegm, frequent throat clearing, cough, hoarseness and sensation of something stuck in throat.   · Many people with LPR do not have symptoms of heartburn ; this is because the throat tissue is more sensitive to stomach acid  · Tips to reduce acid reflux   · Decrease caffeine intake if possible and drink at least 2 to 3 liters of water per day ( unless you are instructed by another doctor to not drink a lot of fluid because of a heart condition for example)   · Mint, citrus, tomato , red wine,spicy food, and chocolate can worsen reflux   · Do not eat dinner close to when you go to sleep . Allow at least 3 hours between last meal and sleep  · Sleep with head of bed elevated   · Medications to start: I will prescribe you a medication that helps decrease acid production in the stomach called a proton pump inhibitor (PPI). Examples of this type of medication include omeprazole (prilosec) , esomeprazole (nexium), and pantoprazole (protonix). Sometimes this medication can be taken once daily to control symptoms. In some patients, the medication needs to be taken twice daily to control symptoms. Always take the first dose of the medication immediately upon awakening in the morning - this is when the acid pumps in your stomach start working. If you are prescribed to take the medication twice daily, take the second dose 30 minutes before your dinner. Your prescription bottle will say how often to take the medication. Please note that it can take 4 to 6 weeks of daily use to notice a change in your symptoms.     It was nice  meeting you today, and I look forward to helping you feel better soon. Please don't hesitate to call if you have any other questions or concerns, or if I can be of any assistance in the meantime.      Emilee Torrez MD    [facility]     Phone  [phone]    Fax      [phone]        Emilee Torrez MD  Otorhinolaryngology

## 2020-09-22 ENCOUNTER — OFFICE VISIT (OUTPATIENT)
Dept: OTOLARYNGOLOGY | Facility: CLINIC | Age: 50
End: 2020-09-22
Payer: COMMERCIAL

## 2020-09-22 VITALS — BODY MASS INDEX: 45.03 KG/M2 | WEIGHT: 279 LBS

## 2020-09-22 DIAGNOSIS — H90.72 MIXED CONDUCTIVE AND SENSORINEURAL HEARING LOSS OF LEFT EAR, UNSPECIFIED HEARING STATUS ON CONTRALATERAL SIDE: ICD-10-CM

## 2020-09-22 DIAGNOSIS — H92.02 LEFT EAR PAIN: ICD-10-CM

## 2020-09-22 PROCEDURE — 3008F BODY MASS INDEX DOCD: CPT | Mod: CPTII,S$GLB,, | Performed by: OTOLARYNGOLOGY

## 2020-09-22 PROCEDURE — 3008F PR BODY MASS INDEX (BMI) DOCUMENTED: ICD-10-PCS | Mod: CPTII,S$GLB,, | Performed by: OTOLARYNGOLOGY

## 2020-09-22 PROCEDURE — 99214 PR OFFICE/OUTPT VISIT, EST, LEVL IV, 30-39 MIN: ICD-10-PCS | Mod: S$GLB,,, | Performed by: OTOLARYNGOLOGY

## 2020-09-22 PROCEDURE — 99999 PR PBB SHADOW E&M-EST. PATIENT-LVL III: ICD-10-PCS | Mod: PBBFAC,,, | Performed by: OTOLARYNGOLOGY

## 2020-09-22 PROCEDURE — 99999 PR PBB SHADOW E&M-EST. PATIENT-LVL III: CPT | Mod: PBBFAC,,, | Performed by: OTOLARYNGOLOGY

## 2020-09-22 PROCEDURE — 99214 OFFICE O/P EST MOD 30 MIN: CPT | Mod: S$GLB,,, | Performed by: OTOLARYNGOLOGY

## 2020-09-22 NOTE — PROGRESS NOTES
Subjective:       Patient ID: Steph Sterling is a 50 y.o. female.    Chief Complaint: Ear Fullness    HPI     Steph Sterling is a 50 y.o. female with history of repeat issues with wax build up in the left ear.  She also complains of left ear fullness and hearing loss.  She denies any otorrhea or history of chronic ear infections.  This problem has been going on for at least 3-4 months    Review of Systems   Constitutional: Negative.  Negative for chills and fever.   HENT: Positive for ear discharge, ear pain and hearing loss. Negative for sore throat and trouble swallowing.    Eyes: Negative.    Respiratory: Negative.  Negative for apnea and chest tightness.    Cardiovascular: Negative.  Negative for chest pain.   Gastrointestinal: Negative.    Endocrine: Negative.    Genitourinary: Negative.    Musculoskeletal: Negative.    Integumentary:  Negative.   Allergic/Immunologic: Negative.    Neurological: Negative.    Hematological: Negative.    Psychiatric/Behavioral: Negative.          Objective:      Physical Exam  Constitutional:       Appearance: She is well-developed.   HENT:      Head: Normocephalic and atraumatic.      Right Ear: Tympanic membrane, ear canal and external ear normal.      Left Ear: Tympanic membrane, ear canal and external ear normal. There is no impacted cerumen.      Ears:      Pickens exam findings: lateralizes right.     Right Rinne: AC > BC.     Left Rinne: AC > BC.     Comments: Binocular Microscopy  Indications: history of wax build up, left sided hearing loss  Details: binocular microscopy used to examine both ears  Findings  AS: EAC clear, but was just cleaned yesterday, TM intact without retraction pocket.  Pt continues to note symptoms of hearing loss and ear fullness       Nose: Nose normal.      Mouth/Throat:      Pharynx: Uvula midline.   Neck:      Musculoskeletal: Normal range of motion.      Thyroid: No thyroid mass.       Audiogram tracings independently reviewed and  discussed with patient mild mixed hearing loss AS     CT temporal bones image independently reviewed by me and show: no abnormalities of temporal bones or ears    Assessment:       1. Asymmetrical left sensorineural hearing loss    2. Left ear pain    3. Mixed conductive and sensorineural hearing loss of left ear, unspecified hearing status on contralateral side        Plan:           Is a 50-year-old female with history of left ear fullness and ear wax buildup.  She has been noted to have dry super noses of the left ear and required multiple cleanings.  This started this past spring.  Prior to this she has no ear history.  Today on examination her ear canals completely clean tympanic membrane appears normal.  She still complains of left-sided hearing loss and ear fullness.  Audiogram shows a mixed loss in that ear.  Her tuning fork lateralizes to the right.  Due to possible sensorineural component will recommend MRI with contrast to rule out vestibular schwannoma.    Otherwise recommend lubricating agents for the ear such as baby oil.  Recommend recheck hearing in 6 months to ear

## 2020-09-22 NOTE — PROGRESS NOTES
Answers for HPI/ROS submitted by the patient on 9/22/2020   None of these: Yes  hearing loss: Yes  ear discharge: Yes  ear pain: Yes  None of these : Yes  None of these: Yes  None of these : Yes  None of these: Yes  None of these: Yes  None of these: Yes  None of these : Yes  None of these: Yes  None of these : Yes  None of these: Yes  None of these: Yes  None of these: Yes

## 2020-09-22 NOTE — LETTER
September 22, 2020      Emilee Torrez MD  [address] [facility] [address]  [place] [address]           [facility]  [address]  [facility] [address]  Phone: [phone]  Fax: [phone]          Patient: Steph Sterling   MR Number: [id number]   YOB: 1970   Date of Visit: 9/22/2020       Dear Dr. Emilee Torrez:    Thank you for referring Steph Sterling to me for evaluation. Attached you will find relevant portions of my assessment and plan of care.    If you have questions, please do not hesitate to call me. I look forward to following Steph Sterling along with you.    Sincerely,    Ayaan La MD    Enclosure  CC:  No Recipients    If you would like to receive this communication electronically, please contact [email or web address] or [phone] to request more information on [employer] access.    For providers and/or their staff who would like to refer a patient to [facility], please contact us through our one-stop-shop provider referral line, [facility], at [phone].    If you feel you have received this communication in error or would no longer like to receive these types of communications, please e-mail [email or web address]

## 2021-03-01 RX ORDER — PANTOPRAZOLE SODIUM 40 MG/1
TABLET, DELAYED RELEASE ORAL
Qty: 30 TABLET | Refills: 3 | OUTPATIENT
Start: 2021-03-01

## 2021-03-04 ENCOUNTER — IMMUNIZATION (OUTPATIENT)
Dept: PHARMACY | Facility: CLINIC | Age: 51
End: 2021-03-04
Payer: COMMERCIAL

## 2021-03-04 DIAGNOSIS — Z23 NEED FOR VACCINATION: Primary | ICD-10-CM

## 2021-04-01 ENCOUNTER — IMMUNIZATION (OUTPATIENT)
Dept: PHARMACY | Facility: CLINIC | Age: 51
End: 2021-04-01
Payer: COMMERCIAL

## 2021-04-01 DIAGNOSIS — Z23 NEED FOR VACCINATION: Primary | ICD-10-CM

## 2021-04-27 ENCOUNTER — CLINICAL SUPPORT (OUTPATIENT)
Dept: URGENT CARE | Facility: CLINIC | Age: 51
End: 2021-04-27
Payer: COMMERCIAL

## 2021-04-27 DIAGNOSIS — Z11.59 SCREENING FOR VIRAL DISEASE: Primary | ICD-10-CM

## 2021-04-27 LAB
CTP QC/QA: YES
SARS-COV-2 RDRP RESP QL NAA+PROBE: NEGATIVE

## 2021-04-27 PROCEDURE — U0002 COVID-19 LAB TEST NON-CDC: HCPCS | Mod: QW,S$GLB,, | Performed by: PHYSICIAN ASSISTANT

## 2021-04-27 PROCEDURE — U0002: ICD-10-PCS | Mod: QW,S$GLB,, | Performed by: PHYSICIAN ASSISTANT

## 2021-07-21 ENCOUNTER — HOSPITAL ENCOUNTER (OUTPATIENT)
Dept: RADIOLOGY | Facility: HOSPITAL | Age: 51
Discharge: HOME | End: 2021-07-21
Attending: INTERNAL MEDICINE
Payer: COMMERCIAL

## 2021-07-21 VITALS — WEIGHT: 279 LBS | BODY MASS INDEX: 45.03 KG/M2

## 2021-07-21 DIAGNOSIS — Z12.31 ENCOUNTER FOR SCREENING MAMMOGRAM FOR MALIGNANT NEOPLASM OF BREAST: ICD-10-CM

## 2021-07-21 PROCEDURE — 77067 MAMMO DIGITAL SCREENING BILAT WITH TOMOSYNTHESIS_CAD: ICD-10-PCS | Mod: 26,,, | Performed by: RADIOLOGY

## 2021-07-21 PROCEDURE — 77067 SCR MAMMO BI INCL CAD: CPT | Mod: 26,,, | Performed by: RADIOLOGY

## 2021-07-21 PROCEDURE — 77063 MAMMO DIGITAL SCREENING BILAT WITH TOMOSYNTHESIS_CAD: ICD-10-PCS | Mod: 26,,, | Performed by: RADIOLOGY

## 2021-07-21 PROCEDURE — 77067 SCR MAMMO BI INCL CAD: CPT | Mod: TC

## 2021-07-21 PROCEDURE — 77063 BREAST TOMOSYNTHESIS BI: CPT | Mod: 26,,, | Performed by: RADIOLOGY

## 2021-10-18 ENCOUNTER — TELEPHONE (OUTPATIENT)
Dept: OTOLARYNGOLOGY | Facility: CLINIC | Age: 51
End: 2021-10-18

## 2021-10-20 ENCOUNTER — PATIENT MESSAGE (OUTPATIENT)
Dept: OTOLARYNGOLOGY | Facility: CLINIC | Age: 51
End: 2021-10-20
Payer: COMMERCIAL

## 2021-10-28 ENCOUNTER — CLINICAL SUPPORT (OUTPATIENT)
Dept: AUDIOLOGY | Facility: CLINIC | Age: 51
End: 2021-10-28
Payer: COMMERCIAL

## 2021-10-28 ENCOUNTER — OFFICE VISIT (OUTPATIENT)
Dept: OTOLARYNGOLOGY | Facility: CLINIC | Age: 51
End: 2021-10-28
Payer: COMMERCIAL

## 2021-10-28 VITALS — DIASTOLIC BLOOD PRESSURE: 78 MMHG | SYSTOLIC BLOOD PRESSURE: 130 MMHG

## 2021-10-28 DIAGNOSIS — M26.622 TMJ TENDERNESS, LEFT: Primary | ICD-10-CM

## 2021-10-28 DIAGNOSIS — H93.293 ABNORMAL AUDITORY PERCEPTION OF BOTH EARS: Primary | ICD-10-CM

## 2021-10-28 PROCEDURE — 1160F RVW MEDS BY RX/DR IN RCRD: CPT | Mod: CPTII,S$GLB,, | Performed by: OTOLARYNGOLOGY

## 2021-10-28 PROCEDURE — 3078F DIAST BP <80 MM HG: CPT | Mod: CPTII,S$GLB,, | Performed by: OTOLARYNGOLOGY

## 2021-10-28 PROCEDURE — 3075F SYST BP GE 130 - 139MM HG: CPT | Mod: CPTII,S$GLB,, | Performed by: OTOLARYNGOLOGY

## 2021-10-28 PROCEDURE — 3075F PR MOST RECENT SYSTOLIC BLOOD PRESS GE 130-139MM HG: ICD-10-PCS | Mod: CPTII,S$GLB,, | Performed by: OTOLARYNGOLOGY

## 2021-10-28 PROCEDURE — 92567 TYMPANOMETRY: CPT | Mod: S$GLB,,, | Performed by: AUDIOLOGIST

## 2021-10-28 PROCEDURE — 1159F MED LIST DOCD IN RCRD: CPT | Mod: CPTII,S$GLB,, | Performed by: OTOLARYNGOLOGY

## 2021-10-28 PROCEDURE — 1160F PR REVIEW ALL MEDS BY PRESCRIBER/CLIN PHARMACIST DOCUMENTED: ICD-10-PCS | Mod: CPTII,S$GLB,, | Performed by: OTOLARYNGOLOGY

## 2021-10-28 PROCEDURE — 99212 OFFICE O/P EST SF 10 MIN: CPT | Mod: S$GLB,,, | Performed by: OTOLARYNGOLOGY

## 2021-10-28 PROCEDURE — 99212 PR OFFICE/OUTPT VISIT, EST, LEVL II, 10-19 MIN: ICD-10-PCS | Mod: S$GLB,,, | Performed by: OTOLARYNGOLOGY

## 2021-10-28 PROCEDURE — 3078F PR MOST RECENT DIASTOLIC BLOOD PRESSURE < 80 MM HG: ICD-10-PCS | Mod: CPTII,S$GLB,, | Performed by: OTOLARYNGOLOGY

## 2021-10-28 PROCEDURE — 1159F PR MEDICATION LIST DOCUMENTED IN MEDICAL RECORD: ICD-10-PCS | Mod: CPTII,S$GLB,, | Performed by: OTOLARYNGOLOGY

## 2021-10-28 PROCEDURE — 92567 PR TYMPA2METRY: ICD-10-PCS | Mod: S$GLB,,, | Performed by: AUDIOLOGIST

## 2021-10-28 PROCEDURE — 92557 PR COMPREHENSIVE HEARING TEST: ICD-10-PCS | Mod: S$GLB,,, | Performed by: AUDIOLOGIST

## 2021-10-28 PROCEDURE — 92557 COMPREHENSIVE HEARING TEST: CPT | Mod: S$GLB,,, | Performed by: AUDIOLOGIST

## 2021-10-28 RX ORDER — DIETHYLPROPION HYDROCHLORIDE 75 MG/1
75 TABLET, EXTENDED RELEASE ORAL EVERY MORNING
COMMUNITY
Start: 2021-10-15

## 2023-08-03 DIAGNOSIS — Z12.31 ENCOUNTER FOR SCREENING MAMMOGRAM FOR MALIGNANT NEOPLASM OF BREAST: Primary | ICD-10-CM

## 2024-06-19 ENCOUNTER — HOSPITAL ENCOUNTER (OUTPATIENT)
Dept: RADIOLOGY | Facility: HOSPITAL | Age: 54
Discharge: HOME | End: 2024-06-19
Attending: INTERNAL MEDICINE
Payer: COMMERCIAL

## 2024-06-19 DIAGNOSIS — Z12.31 ENCOUNTER FOR SCREENING MAMMOGRAM FOR MALIGNANT NEOPLASM OF BREAST: ICD-10-CM

## 2024-06-19 PROCEDURE — 77067 SCR MAMMO BI INCL CAD: CPT | Mod: 26,,, | Performed by: RADIOLOGY

## 2024-06-19 PROCEDURE — 77063 BREAST TOMOSYNTHESIS BI: CPT | Mod: 26,,, | Performed by: RADIOLOGY

## 2024-06-19 PROCEDURE — 77067 SCR MAMMO BI INCL CAD: CPT | Mod: TC

## 2024-06-24 ENCOUNTER — OFFICE VISIT (OUTPATIENT)
Dept: OBSTETRICS AND GYNECOLOGY | Facility: CLINIC | Age: 54
End: 2024-06-24
Payer: COMMERCIAL

## 2024-06-24 VITALS
WEIGHT: 282.75 LBS | HEIGHT: 66 IN | DIASTOLIC BLOOD PRESSURE: 78 MMHG | BODY MASS INDEX: 45.44 KG/M2 | SYSTOLIC BLOOD PRESSURE: 122 MMHG

## 2024-06-24 DIAGNOSIS — R35.0 URINARY FREQUENCY: ICD-10-CM

## 2024-06-24 DIAGNOSIS — Z01.419 ENCOUNTER FOR GYNECOLOGICAL EXAMINATION WITHOUT ABNORMAL FINDING: Primary | ICD-10-CM

## 2024-06-24 DIAGNOSIS — R32 URINARY INCONTINENCE, UNSPECIFIED TYPE: ICD-10-CM

## 2024-06-24 DIAGNOSIS — Z12.39 SCREENING BREAST EXAMINATION: ICD-10-CM

## 2024-06-24 LAB
MICROSCOPIC COMMENT: NORMAL
RBC #/AREA URNS HPF: 4 /HPF (ref 0–4)
SQUAMOUS #/AREA URNS HPF: 2 /HPF

## 2024-06-24 PROCEDURE — 99396 PREV VISIT EST AGE 40-64: CPT | Mod: S$GLB,,,

## 2024-06-24 PROCEDURE — 87624 HPV HI-RISK TYP POOLED RSLT: CPT

## 2024-06-24 PROCEDURE — 87591 N.GONORRHOEAE DNA AMP PROB: CPT

## 2024-06-24 PROCEDURE — 81000 URINALYSIS NONAUTO W/SCOPE: CPT

## 2024-06-24 PROCEDURE — 3078F DIAST BP <80 MM HG: CPT | Mod: CPTII,S$GLB,,

## 2024-06-24 PROCEDURE — 3008F BODY MASS INDEX DOCD: CPT | Mod: CPTII,S$GLB,,

## 2024-06-24 PROCEDURE — 1159F MED LIST DOCD IN RCRD: CPT | Mod: CPTII,S$GLB,,

## 2024-06-24 PROCEDURE — 3074F SYST BP LT 130 MM HG: CPT | Mod: CPTII,S$GLB,,

## 2024-06-24 PROCEDURE — 87491 CHLMYD TRACH DNA AMP PROBE: CPT

## 2024-06-24 PROCEDURE — 87086 URINE CULTURE/COLONY COUNT: CPT

## 2024-06-24 PROCEDURE — 99999 PR PBB SHADOW E&M-EST. PATIENT-LVL III: CPT | Mod: PBBFAC,,,

## 2024-06-24 RX ORDER — IBUPROFEN 600 MG/1
600 TABLET ORAL EVERY 6 HOURS PRN
COMMUNITY
Start: 2024-05-15

## 2024-06-24 RX ORDER — NITROFURANTOIN 25; 75 MG/1; MG/1
100 CAPSULE ORAL 2 TIMES DAILY
Qty: 14 CAPSULE | Refills: 0 | Status: SHIPPED | OUTPATIENT
Start: 2024-06-24 | End: 2024-07-01

## 2024-06-24 NOTE — PROGRESS NOTES
CC: Annual  HISTORY OF PRESENT ILLNESS:    Steph Sterling is a 54 y.o. female, , Patient's last menstrual period was 2023 (approximate).,  presents for a routine exam.  She is sexually active. She uses salpingo- oophorectomy  for contraception.  History of STDs: denies.  She does want STD screening.  Denies any other GYN complaints.      The patient participates in regular exercise: yes (walks dog daily).  The patient does not smoke.  The patient wears seatbelts.   Pt denies any domestic violence. denies tattoos or blood transfusions    SCREENING:   Pap: 2018 (done today)   Mammogram:  2024, TC Score: 9.78 %   Colonoscopy: N/A   DEXA:  N/A     GYN FH:   Breast cancer: none  Colon cancer: none  Ovarian cancer: none  Endometrial cancer: none    She reports complaints of urinary frequency and intermittent urinary incontinence for the last 3-4 months.  She denies dysuria. Urine dipstick shows positive for protein and positive for leukocytes and blood.     She also reports increased hot flashes over the last few months.  Her last menstrual cycle was 2023.  We discussed perimenopausal symptoms.  Nonhormonal menopausal supplement education given as well.     OB History    Para Term  AB Living   1 1 1 0 0 1   SAB IAB Ectopic Multiple Live Births   0 0 0 0 1      # Outcome Date GA Lbr Lio/2nd Weight Sex Type Anes PTL Lv   1 Term 05 39w0d   F Vag-Spont   BEAR        Past Medical History:  Past Medical History:   Diagnosis Date    Achilles tendinitis     Cataract     HTN (hypertension) 2012    Migraine with acute onset aura 2012    Obesity     Ovarian cyst        Past Surgical History:  Past Surgical History:   Procedure Laterality Date    ANKLE SURGERY Right 11-4-15    achilles tendon repair    SALPINGOOPHORECTOMY  11/15/2017    VAGINAL DELIVERY         Family History:  Family History   Problem Relation Name Age of Onset    Hypertension Father      Diabetes Mother       Hypertension Mother      Cataracts Mother      No Known Problems Brother      No Known Problems Brother      Hypertension Maternal Grandmother      Cataracts Maternal Grandmother      Stroke Maternal Grandfather      No Known Problems Sister      No Known Problems Maternal Aunt      No Known Problems Maternal Uncle      No Known Problems Paternal Aunt      No Known Problems Paternal Uncle      No Known Problems Paternal Grandmother      No Known Problems Paternal Grandfather      Amblyopia Neg Hx      Blindness Neg Hx      Cancer Neg Hx      Glaucoma Neg Hx      Macular degeneration Neg Hx      Retinal detachment Neg Hx      Strabismus Neg Hx      Thyroid disease Neg Hx         Allergies:  Review of patient's allergies indicates:  No Known Allergies    Medications:  Current Outpatient Medications on File Prior to Visit   Medication Sig Dispense Refill    azelastine (ASTELIN) 137 mcg (0.1 %) nasal spray 1 spray (137 mcg total) by Nasal route 2 (two) times daily. 30 mL 3    clotrimazole-betamethasone 1-0.05% (LOTRISONE) cream Apply topically 2 (two) times daily. Apply small amount to ear canal 1 Tube 0    fluticasone propionate (FLONASE) 50 mcg/actuation nasal spray 1 spray (50 mcg total) by Each Nostril route 2 (two) times daily. 18.2 mL 3    ibuprofen (ADVIL,MOTRIN) 600 MG tablet Take 600 mg by mouth every 6 (six) hours as needed for Pain.      losartan-hydrochlorothiazide 50-12.5 mg (HYZAAR) 50-12.5 mg per tablet       methocarbamol (ROBAXIN) 500 MG Tab Take 500 mg by mouth once daily.      pantoprazole (PROTONIX) 40 MG tablet Take 1 tablet (40 mg total) by mouth once daily. 30 tablet 3    phentermine (ADIPEX-P) 37.5 mg tablet TK 1 T PO QD      [DISCONTINUED] diclofenac sodium (VOLTAREN) 1 % Gel Apply 2 g topically 4 (four) times daily. for 10 days (Patient not taking: Reported on 10/28/2021) 100 g 0    [DISCONTINUED] diethylpropion (TENUATE) 75 mg SR tablet Take 75 mg by mouth every morning.       No current  facility-administered medications on file prior to visit.       Social History:  Social History     Tobacco Use    Smoking status: Never    Smokeless tobacco: Never   Substance Use Topics    Alcohol use: Yes     Alcohol/week: 0.0 standard drinks of alcohol     Comment: occasional    Drug use: No               ROS:   GENERAL: Feeling well overall. Denies fever or chills.   SKIN: Denies rash or lesions.   HEAD: Denies head injury or headache.   NODES: Denies enlarged lymph nodes.   CHEST: Denies chest pain or shortness of breath.   CARDIOVASCULAR: Denies palpitations or left sided chest pain.   ABDOMEN: No abdominal pain, constipation, diarrhea, nausea, vomiting or rectal bleeding.   URINARY: No dysuria, hematuria, or burning on urination. +reports frequency,+ reports urinary incontinence  REPRODUCTIVE: See HPI.   BREASTS: Denies pain, lumps, or nipple discharge.   HEMATOLOGIC: No easy bruisability or excessive bleeding.   MUSCULOSKELETAL: Denies joint pain or swelling.   NEUROLOGIC: Denies syncope or weakness.   PSYCHIATRIC: Denies depression, anxiety or mood swings.    PE:   APPEARANCE: Well nourished, well developed, Black or  female in no acute distress.  NODES: no cervical, supraclavicular, or inguinal lymphadenopathy  BREASTS: Symmetrical, no skin changes or visible lesions. No palpable masses, nipple discharge or adenopathy bilaterally.  ABDOMEN: Soft. No tenderness or masses. No distention. No hernias palpated. No CVA tenderness.  VULVA: No lesions. Normal external female genitalia.  URETHRAL MEATUS: Normal size and location, no lesions, no prolapse.  URETHRA: No masses, tenderness, or prolapse.  VAGINA:   Moist. No lesions or lacerations noted. No abnormal discharge present. No odor present.   CERVIX: No lesions or discharge. No cervical motion tenderness.   UTERUS: Normal size, regular shape, mobile, non-tender.  ADNEXA: No tenderness. No fullness or masses palpated in the adnexal regions.    ANUS PERINEUM: Normal.      Diagnosis:  1. Encounter for gynecological examination without abnormal finding    2. Screening breast examination    3. Urinary frequency    4. Urinary incontinence, unspecified type        Plan:     Orders Placed This Encounter    HPV High Risk Genotypes, PCR    CULTURE, URINE    Urinalysis Microscopic    Ambulatory referral/consult to Urology    Liquid-Based Pap Smear, Screening    nitrofurantoin, macrocrystal-monohydrate, (MACROBID) 100 MG capsule     - Self breast exams encouraged  - Pap and HPV done today.  - Screening tests as ordered.  - Diet and exercise encouraged.  Seat belt use encouraged.  Reviewed ASCCP Pap guidelines and screening recommendations.  Calcium and vitamin D recommended.     Counseling: injury prevention: Driving under the influence of alcohol  Weapons  Seatbelts  Bicycle helmets  Adequate sleep  Exercise  Stress management techniques  indications for and frequency of periodic gynecologic exam  reviewed current Pap guidelines. Explained new understanding of natural history of cervical disease and improved Paps. Recommended guideline concordant care.    The patient was counseled today on ACS PAP guidelines, with recommendations for yearly pelvic exams unless their uterus, cervix, and ovaries were removed for benign reasons; in that case, examinations every 3-5 years are recommended.  The patient was also counseled regarding monthly breast self-examination, routine STD screening for at-risk populations, prophylactic immunizations for transmitted infections such as  HPV, Pertussis, or Influenza as appropriate, and yearly mammograms when indicated by ACS guidelines.  She was advised to see her primary care physician for all other health maintenance.    Counseling session lasted approximately 10 minutes, and all her questions were answered.    Follow-up with me in 1 year for routine exam or sooner if needed.    Ulises Persno, JAYSHREE-BC

## 2024-06-25 LAB
C TRACH DNA SPEC QL NAA+PROBE: NOT DETECTED
N GONORRHOEA DNA SPEC QL NAA+PROBE: NOT DETECTED

## 2024-06-26 NOTE — PROGRESS NOTES
Subjective:       Steph Sterling is a 54 y.o. female who is a new patient who was referred by Ulises Person  for evaluation of frequency.      Reports urinary frequency and urgency with UI x 3-4mths. No dysuria, gross hematuria. Nocturia x 3-4 with leakage. No LE edema. Questionable snoring/no known MYA. No recurrent UTIs. Denies STEPHANIE.     Recent UA micro with 4 RBCs. UCx negative. States she was given abx by gyn recently. Nonsmoker. No h/o nephrolithiasis. Family hx of kidney stones. No fam hx of  malignancy.     PVR (bladder scan) today - 32cc      The following portions of the patient's history were reviewed and updated as appropriate: allergies, current medications, past family history, past medical history, past social history, past surgical history and problem list.    Review of Systems  12 point review of systems completed. Pertinent positive and negatives listed in HPI        Objective:    Vitals: Wt 128.8 kg (283 lb 13.5 oz)   LMP 12/01/2023 (Approximate)   BMI 45.81 kg/m²     Physical Exam   General: well developed, well nourished in no acute distress  Head: normocephalic, atraumatic  Neck: no obvious enlargement of thyroid  HEENT: EOMI, mucus membranes moist, sclera anicteric, no hearing impairment  Lungs: symmetric expansion, non-labored breathing  Neuro: alert and oriented x 3, no gross deficits  Psych: normal judgment and insight, normal mood/affect and non-anxious  Genitourinary: deferred      Lab Review   Urine analysis today in clinic shows positive for red blood cells 50    Lab Results   Component Value Date    WBC 5.84 08/25/2020    HGB 12.2 08/25/2020    HCT 38.2 08/25/2020    MCV 92 08/25/2020     08/25/2020     Lab Results   Component Value Date    CREATININE 0.63 03/04/2019    BUN 16 03/04/2019       Imaging  Images and reports were personally reviewed by me and discussed with patient  CT RSS 2018       Assessment/Plan:      1. Microhematuria    - Discussed etiology and workup  of hematuria   - UCx - recently neg   - Cytology - not indicated   - CT urogram   - Office cystoscopy     2. Urge incontinence    - UUI: Behavioral changes, PFPT, anticholinergics, mirabegron. Botox/InterStim for refractory UUI.   - Trial Gemtesa. Call if coverage issues.     - PVR low     3. Nocturia    - Reduce PM fluids   - Gemtesa         Follow up for cysto

## 2024-06-27 LAB — BACTERIA UR CULT: NORMAL

## 2024-07-02 ENCOUNTER — OFFICE VISIT (OUTPATIENT)
Dept: UROLOGY | Facility: CLINIC | Age: 54
End: 2024-07-02
Payer: COMMERCIAL

## 2024-07-02 VITALS — WEIGHT: 283.81 LBS | BODY MASS INDEX: 45.81 KG/M2

## 2024-07-02 DIAGNOSIS — R31.29 MICROHEMATURIA: Primary | ICD-10-CM

## 2024-07-02 DIAGNOSIS — N39.41 URGE INCONTINENCE: ICD-10-CM

## 2024-07-02 DIAGNOSIS — R35.1 NOCTURIA: ICD-10-CM

## 2024-07-02 PROCEDURE — 99204 OFFICE O/P NEW MOD 45 MIN: CPT | Mod: S$GLB,,, | Performed by: UROLOGY

## 2024-07-02 PROCEDURE — 99999 PR PBB SHADOW E&M-EST. PATIENT-LVL III: CPT | Mod: PBBFAC,,, | Performed by: UROLOGY

## 2024-07-02 PROCEDURE — 3008F BODY MASS INDEX DOCD: CPT | Mod: CPTII,S$GLB,, | Performed by: UROLOGY

## 2024-07-02 PROCEDURE — 1159F MED LIST DOCD IN RCRD: CPT | Mod: CPTII,S$GLB,, | Performed by: UROLOGY

## 2024-07-02 PROCEDURE — 1160F RVW MEDS BY RX/DR IN RCRD: CPT | Mod: CPTII,S$GLB,, | Performed by: UROLOGY

## 2024-07-02 RX ORDER — VIBEGRON 75 MG/1
75 TABLET, FILM COATED ORAL DAILY
Qty: 30 TABLET | Refills: 11 | Status: SHIPPED | OUTPATIENT
Start: 2024-07-02

## 2024-07-05 ENCOUNTER — TELEPHONE (OUTPATIENT)
Dept: UROLOGY | Facility: CLINIC | Age: 54
End: 2024-07-05
Payer: COMMERCIAL

## 2024-07-05 DIAGNOSIS — R31.29 MICROHEMATURIA: Primary | ICD-10-CM

## 2024-07-05 NOTE — TELEPHONE ENCOUNTER
----- Message from Jae Snyder MA sent at 7/5/2024  8:43 AM CDT -----  Regarding: FW: Lab orders    ----- Message -----  From: Francine Wooten  Sent: 7/5/2024   7:03 AM CDT  To: [facility] Staff  Subject: Lab orders                                       Hello,       This patient is scheduled for a ct scan. The patient will need a cmp, bmp, or creatinine lab prior to receiving contrast. Please put in the lab order as soon as possible to avoid any delay in patient care.     Thank you,  Francine TORO

## 2024-07-11 ENCOUNTER — HOSPITAL ENCOUNTER (OUTPATIENT)
Dept: RADIOLOGY | Facility: HOSPITAL | Age: 54
Discharge: HOME | End: 2024-07-11
Attending: UROLOGY
Payer: COMMERCIAL

## 2024-07-11 DIAGNOSIS — R31.29 MICROHEMATURIA: ICD-10-CM

## 2024-07-11 PROCEDURE — 25500020 PHARM REV CODE 255: Performed by: UROLOGY

## 2024-07-11 RX ADMIN — IOHEXOL 125 ML: 350 INJECTION, SOLUTION INTRAVENOUS at 01:07

## 2024-07-15 ENCOUNTER — HOSPITAL ENCOUNTER (OUTPATIENT)
Dept: RADIOLOGY | Facility: HOSPITAL | Age: 54
Discharge: HOME | End: 2024-07-15
Attending: UROLOGY
Payer: COMMERCIAL

## 2024-07-15 PROCEDURE — 25500020 PHARM REV CODE 255: Performed by: UROLOGY

## 2024-07-15 PROCEDURE — 74178 CT ABD&PLV WO CNTR FLWD CNTR: CPT | Mod: TC

## 2024-07-15 PROCEDURE — 74178 CT ABD&PLV WO CNTR FLWD CNTR: CPT | Mod: 26,,, | Performed by: RADIOLOGY

## 2024-07-15 RX ADMIN — IOHEXOL 125 ML: 350 INJECTION, SOLUTION INTRAVENOUS at 04:07

## 2024-07-22 ENCOUNTER — PROCEDURE VISIT (OUTPATIENT)
Dept: UROLOGY | Facility: CLINIC | Age: 54
End: 2024-07-22
Payer: COMMERCIAL

## 2024-07-22 VITALS — BODY MASS INDEX: 46.33 KG/M2 | WEIGHT: 287.06 LBS

## 2024-07-22 DIAGNOSIS — R31.29 MICROHEMATURIA: ICD-10-CM

## 2024-07-22 DIAGNOSIS — N28.1 RENAL CYST: Primary | ICD-10-CM

## 2024-07-22 PROCEDURE — 52000 CYSTOURETHROSCOPY: CPT | Mod: S$GLB,,, | Performed by: UROLOGY

## 2024-07-22 NOTE — PROCEDURES
Cystoscopy    Date/Time: 7/22/2024 11:20 AM    Performed by: Kelsy Mazariegos MD  Authorized by: Kelsy Mazariegos MD    Consent Done?:  Yes (Written)  Timeout: prior to procedure the correct patient, procedure, and site was verified    Prep: patient was prepped and draped in usual sterile fashion    Anesthesia:  Lidocaine jelly  Indications: hematuria    Position:  Dorsal lithotomy  Anesthesia:  Lidocaine jelly  Patient sedated?: No    Preparation: Patient was prepped and draped in usual sterile fashion    Scope type:  Flexible cystoscope  Stent inserted: No    Stent removed: No    External exam normal: Yes    Digital exam performed: No    Urethra normal: Yes (Deep urethral meatus, otherwise normal)    Bladder neck normal: Yes (Squamous metaplasia of trigone)    Bladder normal: Yes     patient tolerated the procedure well with no immediate complications  Comments:        CT uro 7/24 - 6mm LUP hypodensity, too small to characterize. No mass/stone/hydro.     Cysto 7/24 - normal    CHRISTINA in 6mths to check renal hypodensity

## 2025-02-05 ENCOUNTER — HOSPITAL ENCOUNTER (OUTPATIENT)
Dept: RADIOLOGY | Facility: HOSPITAL | Age: 55
Discharge: HOME | End: 2025-02-05
Attending: UROLOGY
Payer: COMMERCIAL

## 2025-02-05 DIAGNOSIS — R31.29 MICROHEMATURIA: ICD-10-CM

## 2025-02-05 DIAGNOSIS — N28.1 RENAL CYST: ICD-10-CM

## 2025-02-05 PROCEDURE — 76770 US EXAM ABDO BACK WALL COMP: CPT | Mod: 26,,, | Performed by: RADIOLOGY

## 2025-02-05 PROCEDURE — 76770 US EXAM ABDO BACK WALL COMP: CPT | Mod: TC

## 2025-03-24 ENCOUNTER — OFFICE VISIT (OUTPATIENT)
Dept: UROLOGY | Facility: CLINIC | Age: 55
End: 2025-03-24
Payer: COMMERCIAL

## 2025-03-24 VITALS — BODY MASS INDEX: 46.99 KG/M2 | WEIGHT: 291.13 LBS

## 2025-03-24 DIAGNOSIS — R31.29 MICROHEMATURIA: ICD-10-CM

## 2025-03-24 DIAGNOSIS — R35.1 NOCTURIA: ICD-10-CM

## 2025-03-24 DIAGNOSIS — N28.9 RENAL LESION: Primary | ICD-10-CM

## 2025-03-24 DIAGNOSIS — N39.41 URGE INCONTINENCE: ICD-10-CM

## 2025-03-24 PROCEDURE — 1159F MED LIST DOCD IN RCRD: CPT | Mod: CPTII,S$GLB,, | Performed by: UROLOGY

## 2025-03-24 PROCEDURE — 99999 PR PBB SHADOW E&M-EST. PATIENT-LVL III: CPT | Mod: PBBFAC,,, | Performed by: UROLOGY

## 2025-03-24 PROCEDURE — 3008F BODY MASS INDEX DOCD: CPT | Mod: CPTII,S$GLB,, | Performed by: UROLOGY

## 2025-03-24 PROCEDURE — 1160F RVW MEDS BY RX/DR IN RCRD: CPT | Mod: CPTII,S$GLB,, | Performed by: UROLOGY

## 2025-03-24 PROCEDURE — 99213 OFFICE O/P EST LOW 20 MIN: CPT | Mod: S$GLB,,, | Performed by: UROLOGY

## 2025-03-24 NOTE — PROGRESS NOTES
Subjective:       Steph Sterling is a 54 y.o. female who is an established patient who was referred by Ulises Person  for evaluation of frequency.      Reports urinary frequency and urgency with UI x 3-4mths. No dysuria, gross hematuria. Nocturia x 3-4 with leakage. No LE edema. Questionable snoring/no known MYA. No recurrent UTIs. Denies STEPHANIE.     Recent UA micro with 4 RBCs. UCx negative. States she was given abx by gyn recently. Nonsmoker. No h/o nephrolithiasis. Family hx of kidney stones. No fam hx of  malignancy.     PVR (bladder scan) today - 32cc    CT uro 7/24 - 6mm L UP hypodensity - too small to characterize. No mass/stones/hydro.   Cysto 7/24 - normal, retracted urethral meatus    CHRISTINA 2/25 - normal, no renal lesion seen.       CHRISTINA 6mths later to check renal hypodensity done 2/2025 - normal, no renal lesion.     Gemtesa working well - still with urgency at night but overall doing better. Nocturia x 2. Still with occasional UUI.     The following portions of the patient's history were reviewed and updated as appropriate: allergies, current medications, past family history, past medical history, past social history, past surgical history and problem list.    Review of Systems  12 point review of systems completed. Pertinent positive and negatives listed in HPI        Objective:    Vitals: Wt 132.1 kg (291 lb 1.9 oz)   BMI 46.99 kg/m²     Physical Exam   General: well developed, well nourished in no acute distress  Head: normocephalic, atraumatic  Neck: no obvious enlargement of thyroid  HEENT: EOMI, mucus membranes moist, sclera anicteric, no hearing impairment  Lungs: symmetric expansion, non-labored breathing  Neuro: alert and oriented x 3, no gross deficits  Psych: normal judgment and insight, normal mood/affect and non-anxious  Genitourinary: deferred      Lab Review   Urine analysis today in clinic - no urine     Lab Results   Component Value Date    WBC 5.84 08/25/2020    HGB 12.2 08/25/2020     HCT 38.2 08/25/2020    MCV 92 08/25/2020     08/25/2020     Lab Results   Component Value Date    CREATININE 0.7 07/11/2024    BUN 16 03/04/2019       Imaging  Images and reports were personally reviewed by me and discussed with patient  CT RSS 2018, CT uro 2025       Assessment/Plan:      1. Microhematuria    - Discussed etiology and workup of hematuria   - UCx - recently neg   - Cytology - not indicated   - CT urogram 7/2024 possible L renal lesion   - Office cystoscopy 7/2024 - normal     2. Urge incontinence    - UUI: Behavioral changes, PFPT, anticholinergics, mirabegron. Botox/InterStim for refractory UUI.   - Gemtesa - doing better. Continue.    - PVR low     3. Nocturia    - Reduce PM fluids   - Gemtesa - doing better         Follow up in 12 mths with CHRISTINA

## 2025-07-17 RX ORDER — VIBEGRON 75 MG/1
75 TABLET, FILM COATED ORAL DAILY
Qty: 30 TABLET | Refills: 11 | Status: SHIPPED | OUTPATIENT
Start: 2025-07-17

## (undated) DEVICE — BLANKET UPPER BODY 78.7X29.9IN

## (undated) DEVICE — TROCAR ENDOPATH XCEL 11MM 10CM

## (undated) DEVICE — CANISTER SUCTION 2 LTR

## (undated) DEVICE — GLOVE SURG BIOGEL LATEX SZ 7.5

## (undated) DEVICE — SYR 10CC LUER LOCK

## (undated) DEVICE — SEE MEDLINE ITEM 154981

## (undated) DEVICE — Device

## (undated) DEVICE — SEE MEDLINE ITEM 146372

## (undated) DEVICE — DRESSING ADH ISLAND 2.5 X 3

## (undated) DEVICE — PAD SANITARY OB STERILE

## (undated) DEVICE — NDL INSUF ULTRA VERESS 120MM

## (undated) DEVICE — SOL 9P NACL IRR PIC IL

## (undated) DEVICE — COVER OVERHEAD SURG LT BLUE

## (undated) DEVICE — PACK LAPAROSCOPY/PELVISCOPY II

## (undated) DEVICE — SEE MEDLINE ITEM 157117

## (undated) DEVICE — IRRIGATOR ENDOSCOPY DISP.

## (undated) DEVICE — TROCAR ENDOPATH XCEL 5X100MM

## (undated) DEVICE — SUT RAPIDE 4-0

## (undated) DEVICE — SOL NS 1000CC

## (undated) DEVICE — UNDERGLOVES BIOGEL PI SZ 6 LF

## (undated) DEVICE — TUBING INSUFFLATION 10

## (undated) DEVICE — SUT CTD VICRYL VIL BR UR-6

## (undated) DEVICE — ELECTRODE REM PLYHSV RETURN 9

## (undated) DEVICE — KIT ANTIFOG

## (undated) DEVICE — PAD PREP 50/CA

## (undated) DEVICE — DEVICE N-SEAL LAPROSCOPIC

## (undated) DEVICE — SUPPORT ULNA NERVE PROTECTOR

## (undated) DEVICE — BLADE SURG CARBON STEEL SZ11

## (undated) DEVICE — BAG TISS RETRV MONARCH 10MM

## (undated) DEVICE — SUT EASE CROSSBOW CLSR SYS